# Patient Record
(demographics unavailable — no encounter records)

---

## 2024-10-24 NOTE — ASSESSMENT
[FreeTextEntry1] : Mr. 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 His recent follow up CT new peripheral lesion in the right lower lung. Referred by Dr. Elfego Agustin ([facility]).  Now s/p Right VATS, robotic assisted, RLL wedge resection with MLND on 04/25/2024. 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 All margins and LNs negative for tumor. [id number] (Stage IA3). - Previous history of squamous cell carcinoma of larynx is noted. The above tumor could be metastatic carcinoma from larynx, however primary lung is in differential diagnosis. Clinical and radiological correlation is suggested. All or portions of this case have undergone intradepartmental review (3, 4). - Chest tube pulled on 4/26/24. Pt with much subcutaneous air post op. No pneumothorax.  Now recently newly diagnosed with colon cancer.   He presents today for follow up with imaging.   I have independently reviewed the medical records and imaging at the time of this office consultation.  Recommendations reviewed with patient during this office visit, and all questions answered; Patient instructed on the importance of follow up and verbalizes understanding.

## 2024-10-24 NOTE — HISTORY OF PRESENT ILLNESS
[FreeTextEntry1] : Mr. 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 His recent follow up CT new peripheral lesion in the right lower lung. Referred by Dr. Elfego Agustin ([facility]).  CT Chest w/IV Contrast on 04/18/2022: - No evidence of thoracic metastasis.  CT Chest w/IV Contrast on 03/10/2023: - No pulmonary nodules are noted. - Small hiatal hernia.  CT Chest on 03/20/2024: - Interval development of approximate 10 mm solid nodule peripheral right lower lobe, abutting pleural surface (3/346, 602/38). - Trace fluid within left pulmonary recess (2/69). - Extensive coronary calcifications. Aortic calcifications.  PET/CT on 04/09/2024: - Asymmetrically increased FDG avid foci in the right hilum when compared with the left hilum. For example a focus in the right hilum shows SUV 3.3 (image 120). - The peripheral right lower lobe nodule evident on recent CT 3/20/2024 appears larger on this nonbreath held exam, 1.5 x 1.3 cm, SUV 16 (image 120); previously 1.1 x 1.0 cm. It is unclear whether this is due to differences in nonbreath held imaging technique or there has been true interval growth. - FDG avid right level 1B lymph node, 1.0 x 0.8 cm, SUV 6.7 (image 118); similar to CT 3/10/2023; previously not present on PET/CT 3/14/2018. - Markedly FDG avid lesion in the distal esophageal wall likely extending a few centimeters. This is concerning for malignancy. - FDG avid 1.8 x 1.1 cm mural-based colon lesion in the pelvis, SUV 10.4 (image 208). - Focal FDG avidity associated with a segment of small bowel in the left lower quadrant with small bowel thickening, possibly focal enteritis.  Now s/p Right VATS, robotic assisted, RLL wedge resection with MLND on 04/25/2024. 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 All margins and LNs negative for tumor. [id number] (Stage IA3). - Previous history of squamous cell carcinoma of larynx is noted. The above tumor could be metastatic carcinoma from larynx, however primary lung is in differential diagnosis. Clinical and radiological correlation is suggested. All or portions of this case have undergone intradepartmental review (3, 4). - Chest tube pulled on 4/26/24. Pt with much subcutaneous air post op. No pneumothorax.  05/15/2024: Path discussed patient and his wife, revealing SCC, tumor could be metastatic carcinoma from larynx, however primary lung is in differential diagnosis. I discussed he has to be closely monitored, and I would like him to obtain PET/CT in 4 months. Additionally, previous PET/CT with suspicious findings in his colon and esophageal wall, discussed he should get EGD and colonoscopy for further evaluation. Will refer to Dr. Dimas (GI).  EGD on 06/21/2024:  - No mass lesion or esophagitis as a cause of abnormal uptake on PET/CT, - Ring/ diverticulum in distal esophagus, benign appearing, biopsied.  * Path of distal esophagus reveals Squamous epithelium with patchy mild acute inflammation, congestion, and reactive changes consistent with reflux.  Colonoscopy on 06/21/2024: - One 20 mm polyp in the transverse colon, removed with endoscopic mucosal resection techniques of saline lift, hot snare, ad clipping. Resected. - Two other large colon polyps, not removed due to prep and bleeding risk with multiple EMR's.  * Path of large bowel, transverse reveals Adenomatous polyp. The stalk and base are free of adenomatous change.  PET/CT on 09/15/2024: - Emphysema.  - Status post interval wedge resection in the right lower lung lobe with removal of the previously seen FDG avid nodule and expected postsurgical changes which are mildly FDG avid. - Focal region of increased, linear FDG activity adjacent to the right lower lobe wedge resection likely corresponds to postsurgical changes. - Increase in conspicuity of asymmetrically increased FDG avidity in the right hilum, likely a lymph node that is difficult to measure on low-dose noncontrast. - A focus of increased FDG avidity adjacent to the right middle and lower lobe pulmonary arteries has a similar of SUV 5.7 (image 112), previously SUV 5.0. However, a second focus of increased FDG activity adjacent to the right lower lobe pulmonary artery has an SUV 10.2 (image 116), previously SUV 4.2. - Tracheostomy tube in place.  - Stable 1.0 x 0.9 cm right level Ib lymph node, SUV 4.7 (image 124 on dedicated/neck CT), previously measuring 1.0 x 0.8 cm, SUV 6.7. - Interval development of nonspecific foci of increased FDG avidity in the prostate gland, possibly inflammatory given time course of development.  - Resolution of distal esophageal and focal colon FDG uptake evident on prior exam.  Colonoscopy on 09/16/2024: - One 25 mm polyp at the recto-sigmoid colon, removed with endoscopic mucosal resection techniques of saline lift, hot snare, ad clipping. Resected. *Path of recto-sigmoid colon polyp reveals adenocarcinoma, moderately differentiated, arising in association with a villoglandular polyp. No definitive lymphovascular invasion. Tumor extensively involves submucosa. Tumor focally involves the deep cauterized margin of the specimen.    *** Referred to Dr. Trae Osuna (colorectal sx) and Dr. Ilene Blanco (heme/onc) ***  09/20/2024: Seen by Dr. Trae Osuna (colorectal sx). Surgery scheduled on 10/23/2024.   Last seen on 09/25/2024: Imaging revealing hilar lymphadenopathy, suspicious for malignancy. Discussed obtaining CT Chest w/IV Contrast in 11/2024 for further evaluation after his planned colon surgery. I recommend he returns to clinic after CT to discuss findings and next step, based on CT findings, may require flexible bronchoscopy with EBUS for tissue diagnosis.  Sx??  CT Chest on.... -   He presents today for follow up.

## 2024-10-31 NOTE — ASSESSMENT
[FreeTextEntry1] : Disease: Lung Ca - 2024 Pathology: squamous cell carcinoma moderately differentiated with necrosis, measuring 2.2 cm in greatest dimension. All margins and LNs negative for tumor.  TNM stage: [id number] (Stage IA3). S/p Right VATS, robotic assisted, RLL wedge resection on 04/25/2024 From 5/15/24 CTSX note: The above tumor could be metastatic carcinoma from larynx, however primary lung is in differential diagnosis.  Disease: Laryngeal Ca -2017 Pathology: sq cell ca   TNM stage: T3, N0, M0 AJCC Stage: III Completed CXRT treatment 8/11/2017 (Cisplatin weekly 40mg/m2, 6 cycles total from June 23-8/11/2017) He had a recurrence (biopsy 3/1/18) and had a total laryngectomy with neck dissections and reconstruction with ALTFF on 4/4/18

## 2024-10-31 NOTE — PHYSICAL EXAM
[Normal] : affect appropriate [de-identified] : indwelling voice prosthesis; stoma  [de-identified] : well healed abd incisions

## 2024-10-31 NOTE — PHYSICAL EXAM
[Normal] : affect appropriate [de-identified] : indwelling voice prosthesis; stoma  [de-identified] : well healed abd incisions

## 2024-10-31 NOTE — REVIEW OF SYSTEMS
[Diarrhea: Grade 0] : Diarrhea: Grade 0 [Negative] : Allergic/Immunologic [Recent Change In Weight] : ~T no recent weight change [Chest Pain] : no chest pain [Abdominal Pain] : no abdominal pain [de-identified] : well healed abd incisions

## 2024-10-31 NOTE — ASSESSMENT
[FreeTextEntry1] : Disease: Lung Ca - 2024 Pathology: squamous cell carcinoma moderately differentiated with necrosis, measuring 2.2 cm in greatest dimension. All margins and LNs negative for tumor.  TNM stage: [id number] (Stage IA3). S/p Right VATS, robotic assisted, RLL wedge resection on 04/25/2024 From 5/15/24 CTSX note: The above tumor could be metastatic carcinoma from larynx, however primary lung is in differential diagnosis.  Disease: Laryngeal Ca -2017 Pathology: sq cell ca   TNM stage: T3, N0, M0 AJCC Stage: III Completed CXRT treatment 8/11/2017 (Cisplatin weekly 40mg/m2, 6 cycles total from June 23-8/11/2017) He had a recurrence (biopsy 3/1/18) and had a total laryngectomy with neck dissections and reconstruction with ALTFF on 4/4/18

## 2024-10-31 NOTE — REVIEW OF SYSTEMS
[Diarrhea: Grade 0] : Diarrhea: Grade 0 [Negative] : Allergic/Immunologic [Recent Change In Weight] : ~T no recent weight change [Chest Pain] : no chest pain [Abdominal Pain] : no abdominal pain [de-identified] : well healed abd incisions

## 2024-10-31 NOTE — RESULTS/DATA
[FreeTextEntry1] : 10/23/24 Path: Colonic segment (sigmoid): Rare superficial residual malignant gland adjacent to biopsy site. No residual invasive carcinoma identified. All margins are widely free of malignant glands. Twelve pericolonic lymph nodes negative for malignancy (0/12). Previous polypectomy site with ulceration and exuberant granulation tissue extending focally into muscularis propria. Pigment (tattoo) present, predominantly in lymph nodes. Colonic segments (colorectal anastomosis): Negative for malignancy. Synoptic Summary Colon and Rectum - Resection Procedure: Sigmoidectomy Tumor Site: Rectosigmoid Histologic Type: Adenocarcinoma Histologic Grade: G2, moderately differentiated Tumor Size: Cannot be determined - Invasive tumor only present in fragmented previous specimen Tumor Extent: Invades submucosa Sub-mucosal Invasion: Present Depth of Sub-mucosal Invasion: Cannot be determined - Only present in fragmented previous specimen Extent of Sub-mucosal Invasion: Cannot be determined - Only present in fragmented previous specimen Macroscopic Tumor Perforation: Not identified Lymphatic and / or Vascular Invasion: Not identified Perineural Invasion: Not identified Tumor Budding Score: Cannot be determined - No invasive tumor in current specimen Type of Polyp in which Invasive Carcinoma Arose: Tubular adenoma Treatment Effect: No known presurgical therapy Margin Status for Invasive Carcinoma: All margins negative for invasive carcinoma Closest Margin(s) to Invasive Carcinoma: Distal Distance from Invasive Carcinoma to Closest Margin: Greater than 1 cm Distance from Invasive Carcinoma to Distal Margin: Distance already reported as closest margin Margin Status for Non-Invasive Tumor: All margins negative for high-grade dysplasia / intramucosal carcinoma and low-grade dysplasia Regional Lymph Node Status: All regional lymph nodes negative for tumor Number of Lymph Nodes Examined: 12 Tumor Deposits: Not identified Distant Site(s) Involved: Cannot be determined pTNM Classification (AJCC 8th Edition) pT Category: pT1 pN Category: pN0  .  COLONIC SEGMENT (SIGMOID): -   RARE SUPERFICIAL RESIDUAL MALIGNANT GLAND ADJACENT TO BIOPSY SITE. SEE COMMENT.  -   NO RESIDUAL INVASIVE CARCINOMA IDENTIFIED. -   ALL MARGINS ARE WIDELY FREE OF MALIGNANT GLANDS. -   TWELVE PERICOLONIC LYMPH NODES NEGATIVE FOR MALIGNANCY (0/12) -   PREVIOUS POLYPECTOMY SITE WITH ULCERATION AND EXUBERANT GRANULATION TISSUE EXTENDING FOCALLY INTO MUSCULARIS PROPRIA. -   PIGMENT ("TATTOO") PRESENT, PREDOMINANTLY IN LYMPH NODES.  2.  Colonic segments (colorectal anastomosis): -   Negative for malignancy.  9/16/24 Path: Colonic tissue (rectosigmoid polyp): adenocarcinoma, moderately differentiated, arising in association with a villoglandular polyp. No definitive lymphovascular invasion. Tumor extensively involves submucosa. Tumor focally involves the deep cauterized margin of the specimen. No loss of nuclear expression of MMR proteins (MLH1, MSH2, MSH6, and PMS2).  9/16/24 Colonoscopy: One 25 mm polyp at the recto-sigmoid colon, removed with endoscopic mucosal resection techniques of saline lift, hot snare, APC, clipping and tattoo. No other polyps.   9/15/24 PET: Compared to PET/CT April 9, 2024, there has been increased FDG avidity of a right hilar lymph node, concerning for lymph node metastasis. Status post right lower lobe wedge resection with postsurgical changes. Status post laryngectomy with reconstruction and tracheostomy. No evidence of local disease recurrence. Stable mildly FDG avid right level 1B lymph node. Interval development of nonspecific foci of increased FDG avidity in the prostate gland, possibly inflammatory given time course of development. Recommend correlation with PSA. Resolution of distal esophageal and focal colon FDG uptake evident on prior exam.  6/21/24 Colonoscopy: Two other large colon polyps, not removed due to prep and bleeding risk with multiple EMR's.   4/9/24 PET: FDG avid right level 1B lymph node, 1.0 x 0.8 cm, SUV 6.7, concerning for metastasis. FDG avid peripheral right lower lobe nodule, 1.5 x 1.3 cm, SUV 16, concerning for malignancy. Mildly FDG avid right hilar focus, indeterminate. Markedly FDG avid lesion in the distal esophageal wall likely extending a few centimeters, SUV 18. This is concerning for malignancy. FDG avid colon lesions, 1.8 x 1.1 cm, SUV 10.4. Focal FDG avidity associated with a segment of small bowel in the left lower quadrant with small bowel thickening, possibly focal enteritis. Marked enlargement of the urinary bladder.

## 2024-10-31 NOTE — HISTORY OF PRESENT ILLNESS
[T: ___] : T[unfilled] [N: ___] : N[unfilled] [AJCC Stage: ____] : AJCC Stage: [unfilled] [de-identified] : The patient was diagnosed with colon cancer in September 2024 at the age of 69. On 4/9/24, he had a PET that showed an FDG avid right level 1B lymph node, 1.0 x 0.8 cm, SUV 6.7, concerning for metastasis. FDG avid peripheral right lower lobe nodule, 1.5 x 1.3 cm, SUV 16, concerning for malignancy. Mildly FDG avid right hilar focus, indeterminate. Markedly FDG avid lesion in the distal esophageal wall likely extending a few centimeters, SUV 18 concerning for malignancy. FDG avid colon lesions, 1.8 x 1.1 cm, SUV 10.4. On 6/21/24, he underwent a colonoscopy where two large colon polyps were found. On 9/15/24, he had a repeat PET scan that showed increased FDG avidity of a right hilar lymph node, concerning for lymph node metastasis. Status post right lower lobe wedge resection with postsurgical changes. Status post laryngectomy with reconstruction and tracheostomy. No evidence of local disease recurrence. Stable mildly FDG avid right level 1B lymph node. Resolution of distal esophageal and focal colon FDG uptake on prior exam. On 9/16/24, a repeat colonoscopy had one 25 mm polyp at the recto-sigmoid colon, removed with EMR. No other polyps; pathology: adenocarcinoma, moderately differentiated, arising in association with a villoglandular polyp. No definitive lymphovascular invasion. Tumor extensively involves submucosa. Tumor focally involves the deep cauterized margin of the specimen. No loss of nuclear expression of MMR proteins (MLH1, MSH2, MSH6, and PMS2). On 10/23/24, he is s/p laparoscopic sigmoidectomy. Pathology showed rare superficial residual malignant glands adjacent to biopsy site. No residual invasive carcinoma identified. All margins are widely free of malignant glands. Twelve pericolonic lymph nodes were negative for malignancy (0/12). Previous polypectomy site with ulceration and exuberant granulation tissue extending focally into muscularis propria.  Colonic segments (colorectal anastomosis): Negative for malignancy. pT1N0.      [de-identified] : Colon adenocarcinoma [de-identified] : Medical Hx: GERD; laryngeal ca; T2DM, Afib on eliquis, HTN, HLD  Surgical Hx: total laryngectomy with neck dissections and reconstruction with ALTFF (tracheostomy) on 4/4/18; s/p robotic wedge resection of lower lobe of RIGHT lung on 04/25/2024; appendectomy  Family Hx: sister breast ca, 50's Social Hx: heavy smoking for >45 years, smoked 1ppd Quit 2004; ETOH none; , family nearby; retired  supervisor for [employer]   [de-identified] : Today he denies PRBPR, abd pain. Well healed surgical sites, normal bowel movements. He has chronic sciatica R sided, following with ortho. See full review of systems below.

## 2024-10-31 NOTE — HISTORY OF PRESENT ILLNESS
[T: ___] : T[unfilled] [N: ___] : N[unfilled] [AJCC Stage: ____] : AJCC Stage: [unfilled] [de-identified] : The patient was diagnosed with colon cancer in September 2024 at the age of 69. On 4/9/24, he had a PET that showed an FDG avid right level 1B lymph node, 1.0 x 0.8 cm, SUV 6.7, concerning for metastasis. FDG avid peripheral right lower lobe nodule, 1.5 x 1.3 cm, SUV 16, concerning for malignancy. Mildly FDG avid right hilar focus, indeterminate. Markedly FDG avid lesion in the distal esophageal wall likely extending a few centimeters, SUV 18 concerning for malignancy. FDG avid colon lesions, 1.8 x 1.1 cm, SUV 10.4. On 6/21/24, he underwent a colonoscopy where two large colon polyps were found. On 9/15/24, he had a repeat PET scan that showed increased FDG avidity of a right hilar lymph node, concerning for lymph node metastasis. Status post right lower lobe wedge resection with postsurgical changes. Status post laryngectomy with reconstruction and tracheostomy. No evidence of local disease recurrence. Stable mildly FDG avid right level 1B lymph node. Resolution of distal esophageal and focal colon FDG uptake on prior exam. On 9/16/24, a repeat colonoscopy had one 25 mm polyp at the recto-sigmoid colon, removed with EMR. No other polyps; pathology: adenocarcinoma, moderately differentiated, arising in association with a villoglandular polyp. No definitive lymphovascular invasion. Tumor extensively involves submucosa. Tumor focally involves the deep cauterized margin of the specimen. No loss of nuclear expression of MMR proteins (MLH1, MSH2, MSH6, and PMS2). On 10/23/24, he is s/p laparoscopic sigmoidectomy. Pathology showed rare superficial residual malignant glands adjacent to biopsy site. No residual invasive carcinoma identified. All margins are widely free of malignant glands. Twelve pericolonic lymph nodes were negative for malignancy (0/12). Previous polypectomy site with ulceration and exuberant granulation tissue extending focally into muscularis propria.  Colonic segments (colorectal anastomosis): Negative for malignancy. pT1N0.      [de-identified] : Colon adenocarcinoma [de-identified] : Medical Hx: GERD; laryngeal ca; T2DM, Afib on eliquis, HTN, HLD  Surgical Hx: total laryngectomy with neck dissections and reconstruction with ALTFF (tracheostomy) on 4/4/18; s/p robotic wedge resection of lower lobe of RIGHT lung on 04/25/2024; appendectomy  Family Hx: sister breast ca, 50's Social Hx: heavy smoking for >45 years, smoked 1ppd Quit 2004; ETOH none; , family nearby; retired  supervisor for [employer]   [de-identified] : Today he denies PRBPR, abd pain. Well healed surgical sites, normal bowel movements. He has chronic sciatica R sided, following with ortho. See full review of systems below.

## 2024-11-06 NOTE — PHYSICAL EXAM
[No Rash or Lesion] : No rash or lesion [Alert] : alert [Oriented to Person] : oriented to person [Oriented to Place] : oriented to place [Oriented to Time] : oriented to time [Calm] : calm [de-identified] : soft, non-tender, non-distended, surgical sites healing well, no erythema or drainage noted. Resolving ecchymosis periumbilical area noted.  [de-identified] : looks well, no distress [de-identified] : moves all extremities without difficulty.

## 2024-11-06 NOTE — ASSESSMENT
[FreeTextEntry1] : 69-year-old male recovering well. Recommend slowly advancing to high fiber diet as tolerated. Avoid heavy lifting for 4  weeks. Follow up with Dr. Osuna in 2 months.

## 2024-11-06 NOTE — HISTORY OF PRESENT ILLNESS
[FreeTextEntry1] : 69-year-old male with history of laryngeal cancer s/p laparoscopic sigmoid colon resection with low pelvic anastomosis for sigmoid colon cancer. Patient is doing well. Tolerating diet, denies fever/chills, reports regular bowel movements.

## 2024-11-20 NOTE — HISTORY OF PRESENT ILLNESS
[FreeTextEntry1] : Mr. 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 His recent follow up CT new peripheral lesion in the right lower lung. Referred by Dr. Elfego Agustin ([facility]).  CT Chest w/IV Contrast on 04/18/2022: - No evidence of thoracic metastasis.  CT Chest w/IV Contrast on 03/10/2023: - No pulmonary nodules are noted. - Small hiatal hernia.  CT Chest on 03/20/2024: - Interval development of approximate 10 mm solid nodule peripheral right lower lobe, abutting pleural surface (3/346, 602/38). - Trace fluid within left pulmonary recess (2/69). - Extensive coronary calcifications. Aortic calcifications.  PET/CT on 04/09/2024: - Asymmetrically increased FDG avid foci in the right hilum when compared with the left hilum. For example a focus in the right hilum shows SUV 3.3 (image 120). - The peripheral right lower lobe nodule evident on recent CT 3/20/2024 appears larger on this nonbreath held exam, 1.5 x 1.3 cm, SUV 16 (image 120); previously 1.1 x 1.0 cm. It is unclear whether this is due to differences in nonbreath held imaging technique or there has been true interval growth. - FDG avid right level 1B lymph node, 1.0 x 0.8 cm, SUV 6.7 (image 118); similar to CT 3/10/2023; previously not present on PET/CT 3/14/2018. - Markedly FDG avid lesion in the distal esophageal wall likely extending a few centimeters. This is concerning for malignancy. - FDG avid 1.8 x 1.1 cm mural-based colon lesion in the pelvis, SUV 10.4 (image 208). - Focal FDG avidity associated with a segment of small bowel in the left lower quadrant with small bowel thickening, possibly focal enteritis.  Now s/p Right VATS, robotic assisted, RLL wedge resection with MLND on 04/25/2024. 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 All margins and LNs negative for tumor. [id number] (Stage IA3). - Previous history of squamous cell carcinoma of larynx is noted. The above tumor could be metastatic carcinoma from larynx, however primary lung is in differential diagnosis. Clinical and radiological correlation is suggested. All or portions of this case have undergone intradepartmental review (3, 4). - Chest tube pulled on 4/26/24. Pt with much subcutaneous air post op. No pneumothorax.  05/15/2024: Path discussed patient and his wife, revealing SCC, tumor could be metastatic carcinoma from larynx, however primary lung is in differential diagnosis. I discussed he has to be closely monitored, and I would like him to obtain PET/CT in 4 months. Additionally, previous PET/CT with suspicious findings in his colon and esophageal wall, discussed he should get EGD and colonoscopy for further evaluation. Will refer to Dr. Dimas (GI).  EGD on 06/21/2024:  - No mass lesion or esophagitis as a cause of abnormal uptake on PET/CT, - Ring/ diverticulum in distal esophagus, benign appearing, biopsied.  * Path of distal esophagus reveals Squamous epithelium with patchy mild acute inflammation, congestion, and reactive changes consistent with reflux.  Colonoscopy on 06/21/2024: - One 20 mm polyp in the transverse colon, removed with endoscopic mucosal resection techniques of saline lift, hot snare, ad clipping. Resected. - Two other large colon polyps, not removed due to prep and bleeding risk with multiple EMR's.  * Path of large bowel, transverse reveals Adenomatous polyp. The stalk and base are free of adenomatous change.  PET/CT on 09/15/2024: - Emphysema.  - Status post interval wedge resection in the right lower lung lobe with removal of the previously seen FDG avid nodule and expected postsurgical changes which are mildly FDG avid. - Focal region of increased, linear FDG activity adjacent to the right lower lobe wedge resection likely corresponds to postsurgical changes. - Increase in conspicuity of asymmetrically increased FDG avidity in the right hilum, likely a lymph node that is difficult to measure on low-dose noncontrast. - A focus of increased FDG avidity adjacent to the right middle and lower lobe pulmonary arteries has a similar of SUV 5.7 (image 112), previously SUV 5.0. However, a second focus of increased FDG activity adjacent to the right lower lobe pulmonary artery has an SUV 10.2 (image 116), previously SUV 4.2. - Tracheostomy tube in place.  - Stable 1.0 x 0.9 cm right level Ib lymph node, SUV 4.7 (image 124 on dedicated/neck CT), previously measuring 1.0 x 0.8 cm, SUV 6.7. - Interval development of nonspecific foci of increased FDG avidity in the prostate gland, possibly inflammatory given time course of development.  - Resolution of distal esophageal and focal colon FDG uptake evident on prior exam.  Colonoscopy on 09/16/2024: - One 25 mm polyp at the recto-sigmoid colon, removed with endoscopic mucosal resection techniques of saline lift, hot snare, ad clipping. Resected. *Path of recto-sigmoid colon polyp reveals adenocarcinoma, moderately differentiated, arising in association with a villoglandular polyp. No definitive lymphovascular invasion. Tumor extensively involves submucosa. Tumor focally involves the deep cauterized margin of the specimen.    *** Referred to Dr. Trae Osuna (colorectal sx) and Dr. Ilene Blanco (heme/onc) ***  Last seen on 09/25/2024: Imaging revealing hilar lymphadenopathy, suspicious for malignancy. Discussed obtaining CT Chest w/IV Contrast in 11/2024 for further evaluation after his planned colon surgery. I recommend he returns to clinic after CT to discuss findings and next step, based on CT findings, may require flexible bronchoscopy with EBUS for tissue diagnosis.  Of Note: S/p laparoscopic sigmoidectomy, mobilization of splenic flexure and lysis of intestinal adhesions with Dr. Osuna on 10/23/2024.   CT Chest w/IV Contrast on 11/07/2024: - Emphysema.  - Right lower lobe wedge resection.  - Bandlike atelectasis/scarring within bilateral lower lobes. - Multiple right hilar lymph nodes, the largest one measures 2.4 x 1.9 cm are increased from prior exams likely metastatic. - Stable thickening of bilateral adrenal glands.  He presents today for follow up. Overall, he reports to be feeling well. Denies any chest pain, shortness of breath, cough, or hemoptysis.

## 2024-11-20 NOTE — ASSESSMENT
[FreeTextEntry1] : Mr. 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 His recent follow up CT new peripheral lesion in the right lower lung. Referred by Dr. Elfego Agustin ([facility]).  Now s/p Right VATS, robotic assisted, RLL wedge resection with MLND on 04/25/2024. 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 All margins and LNs negative for tumor. [id number] (Stage IA3). - Previous history of squamous cell carcinoma of larynx is noted. The above tumor could be metastatic carcinoma from larynx, however primary lung is in differential diagnosis. Clinical and radiological correlation is suggested. All or portions of this case have undergone intradepartmental review (3, 4). - Chest tube pulled on 4/26/24. Pt with much subcutaneous air post op. No pneumothorax.  Now recently newly diagnosed with colon cancer.   He presents today for follow up with imaging.   I have independently reviewed the medical records and imaging at the time of this office consultation. CT Chest reviewed with patient and his wife, once again enlarged hilar lymph node noted, suspicious for malignancy. Discussed biopsy for tissue diagnosis. Procedure approach reviewed with patient. Discussed risks in details, patient is agreeable to proceed. Will schedule for flexible bronchoscopy, EBUS, possible ion biopsy, with cytology sometime in 12/2024 after his scheduled back surgery. He will need to hold Eliquis for 3 days prior to procedure. Will need PST prior.   Recommendations reviewed with patient during this office visit, and all questions answered; Patient instructed on the importance of follow up and verbalizes understanding.    I, NATIVIDAD Vick, personally performed the evaluation and management (E/M) services for this established patient. That E/M includes conducting the examination, assessing all new/exacerbated conditions, and establishing a new plan of care. Today, my ACP, Richie Espinoza NP, was here to observe my evaluation and management services for this new problem/exacerbated condition to be followed going forward.

## 2024-11-20 NOTE — DATA REVIEWED
[FreeTextEntry1] : I have independently reviewed the following: CT Chest w/IV Contrast on 11/07/2024

## 2024-11-20 NOTE — ASSESSMENT
[FreeTextEntry1] : Mr. 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 His recent follow up CT new peripheral lesion in the right lower lung. Referred by Dr. Elfego Agustin ([facility]).  Now s/p Right VATS, robotic assisted, RLL wedge resection with MLND on 04/25/2024. 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 All margins and LNs negative for tumor. [id number] (Stage IA3). - Previous history of squamous cell carcinoma of larynx is noted. The above tumor could be metastatic carcinoma from larynx, however primary lung is in differential diagnosis. Clinical and radiological correlation is suggested. All or portions of this case have undergone intradepartmental review (3, 4). - Chest tube pulled on 4/26/24. Pt with much subcutaneous air post op. No pneumothorax.  Now recently newly diagnosed with colon cancer.   He presents today for follow up with imaging.   I have independently reviewed the medical records and imaging at the time of this office consultation. CT Chest reviewed with patient and his wife, once again enlarged hilar lymph node noted, suspicious for malignancy. Discussed biopsy for tissue diagnosis. Procedure approach reviewed with patient. Discussed risks in details, patient is agreeable to proceed. Will schedule for flexible bronchoscopy, EBUS, possible ion biopsy, with cytology sometime in 12/2024 after his scheduled back surgery. He will need to hold Eliquis for 3 days prior to procedure. Will need PST prior.   Recommendations reviewed with patient during this office visit, and all questions answered; Patient instructed on the importance of follow up and verbalizes understanding.    I, NATIVIDAD Vick, personally performed the evaluation and management (E/M) services for this established patient. That E/M includes conducting the examination, assessing all new/exacerbated conditions, and establishing a new plan of care. Today, my ACP, Richie Espinoza NP, was here to observe my evaluation and management services for this new problem/exacerbated condition to be followed going forward.

## 2024-11-20 NOTE — HISTORY OF PRESENT ILLNESS
[FreeTextEntry1] : Mr. 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 His recent follow up CT new peripheral lesion in the right lower lung. Referred by Dr. Elfego Agustin ([facility]).  CT Chest w/IV Contrast on 04/18/2022: - No evidence of thoracic metastasis.  CT Chest w/IV Contrast on 03/10/2023: - No pulmonary nodules are noted. - Small hiatal hernia.  CT Chest on 03/20/2024: - Interval development of approximate 10 mm solid nodule peripheral right lower lobe, abutting pleural surface (3/346, 602/38). - Trace fluid within left pulmonary recess (2/69). - Extensive coronary calcifications. Aortic calcifications.  PET/CT on 04/09/2024: - Asymmetrically increased FDG avid foci in the right hilum when compared with the left hilum. For example a focus in the right hilum shows SUV 3.3 (image 120). - The peripheral right lower lobe nodule evident on recent CT 3/20/2024 appears larger on this nonbreath held exam, 1.5 x 1.3 cm, SUV 16 (image 120); previously 1.1 x 1.0 cm. It is unclear whether this is due to differences in nonbreath held imaging technique or there has been true interval growth. - FDG avid right level 1B lymph node, 1.0 x 0.8 cm, SUV 6.7 (image 118); similar to CT 3/10/2023; previously not present on PET/CT 3/14/2018. - Markedly FDG avid lesion in the distal esophageal wall likely extending a few centimeters. This is concerning for malignancy. - FDG avid 1.8 x 1.1 cm mural-based colon lesion in the pelvis, SUV 10.4 (image 208). - Focal FDG avidity associated with a segment of small bowel in the left lower quadrant with small bowel thickening, possibly focal enteritis.  Now s/p Right VATS, robotic assisted, RLL wedge resection with MLND on 04/25/2024. 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 All margins and LNs negative for tumor. [id number] (Stage IA3). - Previous history of squamous cell carcinoma of larynx is noted. The above tumor could be metastatic carcinoma from larynx, however primary lung is in differential diagnosis. Clinical and radiological correlation is suggested. All or portions of this case have undergone intradepartmental review (3, 4). - Chest tube pulled on 4/26/24. Pt with much subcutaneous air post op. No pneumothorax.  05/15/2024: Path discussed patient and his wife, revealing SCC, tumor could be metastatic carcinoma from larynx, however primary lung is in differential diagnosis. I discussed he has to be closely monitored, and I would like him to obtain PET/CT in 4 months. Additionally, previous PET/CT with suspicious findings in his colon and esophageal wall, discussed he should get EGD and colonoscopy for further evaluation. Will refer to Dr. Dimas (GI).  EGD on 06/21/2024:  - No mass lesion or esophagitis as a cause of abnormal uptake on PET/CT, - Ring/ diverticulum in distal esophagus, benign appearing, biopsied.  * Path of distal esophagus reveals Squamous epithelium with patchy mild acute inflammation, congestion, and reactive changes consistent with reflux.  Colonoscopy on 06/21/2024: - One 20 mm polyp in the transverse colon, removed with endoscopic mucosal resection techniques of saline lift, hot snare, ad clipping. Resected. - Two other large colon polyps, not removed due to prep and bleeding risk with multiple EMR's.  * Path of large bowel, transverse reveals Adenomatous polyp. The stalk and base are free of adenomatous change.  PET/CT on 09/15/2024: - Emphysema.  - Status post interval wedge resection in the right lower lung lobe with removal of the previously seen FDG avid nodule and expected postsurgical changes which are mildly FDG avid. - Focal region of increased, linear FDG activity adjacent to the right lower lobe wedge resection likely corresponds to postsurgical changes. - Increase in conspicuity of asymmetrically increased FDG avidity in the right hilum, likely a lymph node that is difficult to measure on low-dose noncontrast. - A focus of increased FDG avidity adjacent to the right middle and lower lobe pulmonary arteries has a similar of SUV 5.7 (image 112), previously SUV 5.0. However, a second focus of increased FDG activity adjacent to the right lower lobe pulmonary artery has an SUV 10.2 (image 116), previously SUV 4.2. - Tracheostomy tube in place.  - Stable 1.0 x 0.9 cm right level Ib lymph node, SUV 4.7 (image 124 on dedicated/neck CT), previously measuring 1.0 x 0.8 cm, SUV 6.7. - Interval development of nonspecific foci of increased FDG avidity in the prostate gland, possibly inflammatory given time course of development.  - Resolution of distal esophageal and focal colon FDG uptake evident on prior exam.  Colonoscopy on 09/16/2024: - One 25 mm polyp at the recto-sigmoid colon, removed with endoscopic mucosal resection techniques of saline lift, hot snare, ad clipping. Resected. *Path of recto-sigmoid colon polyp reveals adenocarcinoma, moderately differentiated, arising in association with a villoglandular polyp. No definitive lymphovascular invasion. Tumor extensively involves submucosa. Tumor focally involves the deep cauterized margin of the specimen.    *** Referred to Dr. Trae Osuna (colorectal sx) and Dr. Ilene Blanco (heme/onc) ***  Last seen on 09/25/2024: Imaging revealing hilar lymphadenopathy, suspicious for malignancy. Discussed obtaining CT Chest w/IV Contrast in 11/2024 for further evaluation after his planned colon surgery. I recommend he returns to clinic after CT to discuss findings and next step, based on CT findings, may require flexible bronchoscopy with EBUS for tissue diagnosis.  Of Note: S/p laparoscopic sigmoidectomy, mobilization of splenic flexure and lysis of intestinal adhesions with Dr. Osuna on 10/23/2024.   CT Chest w/IV Contrast on 11/07/2024: - Emphysema.  - Right lower lobe wedge resection.  - Bandlike atelectasis/scarring within bilateral lower lobes. - Multiple right hilar lymph nodes, the largest one measures 2.4 x 1.9 cm are increased from prior exams likely metastatic. - Stable thickening of bilateral adrenal glands.  He presents today for follow up. Overall, he reports to be feeling well. Denies any chest pain, shortness of breath, cough, or hemoptysis.

## 2024-11-20 NOTE — ASSESSMENT
[FreeTextEntry1] : Mr. 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 His recent follow up CT new peripheral lesion in the right lower lung. Referred by Dr. Elfego Agustin ([facility]).  Now s/p Right VATS, robotic assisted, RLL wedge resection with MLND on 04/25/2024. 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 All margins and LNs negative for tumor. [id number] (Stage IA3). - Previous history of squamous cell carcinoma of larynx is noted. The above tumor could be metastatic carcinoma from larynx, however primary lung is in differential diagnosis. Clinical and radiological correlation is suggested. All or portions of this case have undergone intradepartmental review (3, 4). - Chest tube pulled on 4/26/24. Pt with much subcutaneous air post op. No pneumothorax.  Now recently newly diagnosed with colon cancer.   He presents today for follow up with imaging.   I have independently reviewed the medical records and imaging at the time of this office consultation. CT Chest reviewed with patient and his wife, once again enlarged hilar lymph node noted, suspicious for malignancy. Discussed biopsy for tissue diagnosis. Procedure approach reviewed with patient. Discussed risks in details, patient is agreeable to proceed. Will schedule for flexible bronchoscopy, EBUS, possible ion biopsy, with cytology sometime in 12/2024 after his scheduled back surgery. He will need to hold Eliquis for 3 days prior to procedure. Will need PST prior.   Recommendations reviewed with patient during this office visit, and all questions answered; Patient instructed on the importance of follow up and verbalizes understanding.    I, NATIVIDAD Vick, personally performed the evaluation and management (E/M) services for this established patient. That E/M includes conducting the examination, assessing all new/exacerbated conditions, and establishing a new plan of care. Today, my ACP, Rihcie Espinoza NP, was here to observe my evaluation and management services for this new problem/exacerbated condition to be followed going forward.

## 2024-11-20 NOTE — HISTORY OF PRESENT ILLNESS
[FreeTextEntry1] : Mr. 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 His recent follow up CT new peripheral lesion in the right lower lung. Referred by Dr. Elfego Agustin ([facility]).  CT Chest w/IV Contrast on 04/18/2022: - No evidence of thoracic metastasis.  CT Chest w/IV Contrast on 03/10/2023: - No pulmonary nodules are noted. - Small hiatal hernia.  CT Chest on 03/20/2024: - Interval development of approximate 10 mm solid nodule peripheral right lower lobe, abutting pleural surface (3/346, 602/38). - Trace fluid within left pulmonary recess (2/69). - Extensive coronary calcifications. Aortic calcifications.  PET/CT on 04/09/2024: - Asymmetrically increased FDG avid foci in the right hilum when compared with the left hilum. For example a focus in the right hilum shows SUV 3.3 (image 120). - The peripheral right lower lobe nodule evident on recent CT 3/20/2024 appears larger on this nonbreath held exam, 1.5 x 1.3 cm, SUV 16 (image 120); previously 1.1 x 1.0 cm. It is unclear whether this is due to differences in nonbreath held imaging technique or there has been true interval growth. - FDG avid right level 1B lymph node, 1.0 x 0.8 cm, SUV 6.7 (image 118); similar to CT 3/10/2023; previously not present on PET/CT 3/14/2018. - Markedly FDG avid lesion in the distal esophageal wall likely extending a few centimeters. This is concerning for malignancy. - FDG avid 1.8 x 1.1 cm mural-based colon lesion in the pelvis, SUV 10.4 (image 208). - Focal FDG avidity associated with a segment of small bowel in the left lower quadrant with small bowel thickening, possibly focal enteritis.  Now s/p Right VATS, robotic assisted, RLL wedge resection with MLND on 04/25/2024. 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 All margins and LNs negative for tumor. [id number] (Stage IA3). - Previous history of squamous cell carcinoma of larynx is noted. The above tumor could be metastatic carcinoma from larynx, however primary lung is in differential diagnosis. Clinical and radiological correlation is suggested. All or portions of this case have undergone intradepartmental review (3, 4). - Chest tube pulled on 4/26/24. Pt with much subcutaneous air post op. No pneumothorax.  05/15/2024: Path discussed patient and his wife, revealing SCC, tumor could be metastatic carcinoma from larynx, however primary lung is in differential diagnosis. I discussed he has to be closely monitored, and I would like him to obtain PET/CT in 4 months. Additionally, previous PET/CT with suspicious findings in his colon and esophageal wall, discussed he should get EGD and colonoscopy for further evaluation. Will refer to Dr. Dimas (GI).  EGD on 06/21/2024:  - No mass lesion or esophagitis as a cause of abnormal uptake on PET/CT, - Ring/ diverticulum in distal esophagus, benign appearing, biopsied.  * Path of distal esophagus reveals Squamous epithelium with patchy mild acute inflammation, congestion, and reactive changes consistent with reflux.  Colonoscopy on 06/21/2024: - One 20 mm polyp in the transverse colon, removed with endoscopic mucosal resection techniques of saline lift, hot snare, ad clipping. Resected. - Two other large colon polyps, not removed due to prep and bleeding risk with multiple EMR's.  * Path of large bowel, transverse reveals Adenomatous polyp. The stalk and base are free of adenomatous change.  PET/CT on 09/15/2024: - Emphysema.  - Status post interval wedge resection in the right lower lung lobe with removal of the previously seen FDG avid nodule and expected postsurgical changes which are mildly FDG avid. - Focal region of increased, linear FDG activity adjacent to the right lower lobe wedge resection likely corresponds to postsurgical changes. - Increase in conspicuity of asymmetrically increased FDG avidity in the right hilum, likely a lymph node that is difficult to measure on low-dose noncontrast. - A focus of increased FDG avidity adjacent to the right middle and lower lobe pulmonary arteries has a similar of SUV 5.7 (image 112), previously SUV 5.0. However, a second focus of increased FDG activity adjacent to the right lower lobe pulmonary artery has an SUV 10.2 (image 116), previously SUV 4.2. - Tracheostomy tube in place.  - Stable 1.0 x 0.9 cm right level Ib lymph node, SUV 4.7 (image 124 on dedicated/neck CT), previously measuring 1.0 x 0.8 cm, SUV 6.7. - Interval development of nonspecific foci of increased FDG avidity in the prostate gland, possibly inflammatory given time course of development.  - Resolution of distal esophageal and focal colon FDG uptake evident on prior exam.  Colonoscopy on 09/16/2024: - One 25 mm polyp at the recto-sigmoid colon, removed with endoscopic mucosal resection techniques of saline lift, hot snare, ad clipping. Resected. *Path of recto-sigmoid colon polyp reveals adenocarcinoma, moderately differentiated, arising in association with a villoglandular polyp. No definitive lymphovascular invasion. Tumor extensively involves submucosa. Tumor focally involves the deep cauterized margin of the specimen.    *** Referred to Dr. Trae Osuna (colorectal sx) and Dr. Ilene Blanco (heme/onc) ***  Last seen on 09/25/2024: Imaging revealing hilar lymphadenopathy, suspicious for malignancy. Discussed obtaining CT Chest w/IV Contrast in 11/2024 for further evaluation after his planned colon surgery. I recommend he returns to clinic after CT to discuss findings and next step, based on CT findings, may require flexible bronchoscopy with EBUS for tissue diagnosis.  Of Note: S/p laparoscopic sigmoidectomy, mobilization of splenic flexure and lysis of intestinal adhesions with Dr. Osuna on 10/23/2024.   CT Chest w/IV Contrast on 11/07/2024: - Emphysema.  - Right lower lobe wedge resection.  - Bandlike atelectasis/scarring within bilateral lower lobes. - Multiple right hilar lymph nodes, the largest one measures 2.4 x 1.9 cm are increased from prior exams likely metastatic. - Stable thickening of bilateral adrenal glands.  He presents today for follow up. Overall, he reports to be feeling well. Denies any chest pain, shortness of breath, cough, or hemoptysis.

## 2024-11-20 NOTE — HISTORY OF PRESENT ILLNESS
[FreeTextEntry1] : Mr. 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 His recent follow up CT new peripheral lesion in the right lower lung. Referred by Dr. Elfego Agustin ([facility]).  CT Chest w/IV Contrast on 04/18/2022: - No evidence of thoracic metastasis.  CT Chest w/IV Contrast on 03/10/2023: - No pulmonary nodules are noted. - Small hiatal hernia.  CT Chest on 03/20/2024: - Interval development of approximate 10 mm solid nodule peripheral right lower lobe, abutting pleural surface (3/346, 602/38). - Trace fluid within left pulmonary recess (2/69). - Extensive coronary calcifications. Aortic calcifications.  PET/CT on 04/09/2024: - Asymmetrically increased FDG avid foci in the right hilum when compared with the left hilum. For example a focus in the right hilum shows SUV 3.3 (image 120). - The peripheral right lower lobe nodule evident on recent CT 3/20/2024 appears larger on this nonbreath held exam, 1.5 x 1.3 cm, SUV 16 (image 120); previously 1.1 x 1.0 cm. It is unclear whether this is due to differences in nonbreath held imaging technique or there has been true interval growth. - FDG avid right level 1B lymph node, 1.0 x 0.8 cm, SUV 6.7 (image 118); similar to CT 3/10/2023; previously not present on PET/CT 3/14/2018. - Markedly FDG avid lesion in the distal esophageal wall likely extending a few centimeters. This is concerning for malignancy. - FDG avid 1.8 x 1.1 cm mural-based colon lesion in the pelvis, SUV 10.4 (image 208). - Focal FDG avidity associated with a segment of small bowel in the left lower quadrant with small bowel thickening, possibly focal enteritis.  Now s/p Right VATS, robotic assisted, RLL wedge resection with MLND on 04/25/2024. 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 All margins and LNs negative for tumor. [id number] (Stage IA3). - Previous history of squamous cell carcinoma of larynx is noted. The above tumor could be metastatic carcinoma from larynx, however primary lung is in differential diagnosis. Clinical and radiological correlation is suggested. All or portions of this case have undergone intradepartmental review (3, 4). - Chest tube pulled on 4/26/24. Pt with much subcutaneous air post op. No pneumothorax.  05/15/2024: Path discussed patient and his wife, revealing SCC, tumor could be metastatic carcinoma from larynx, however primary lung is in differential diagnosis. I discussed he has to be closely monitored, and I would like him to obtain PET/CT in 4 months. Additionally, previous PET/CT with suspicious findings in his colon and esophageal wall, discussed he should get EGD and colonoscopy for further evaluation. Will refer to Dr. Dimas (GI).  EGD on 06/21/2024:  - No mass lesion or esophagitis as a cause of abnormal uptake on PET/CT, - Ring/ diverticulum in distal esophagus, benign appearing, biopsied.  * Path of distal esophagus reveals Squamous epithelium with patchy mild acute inflammation, congestion, and reactive changes consistent with reflux.  Colonoscopy on 06/21/2024: - One 20 mm polyp in the transverse colon, removed with endoscopic mucosal resection techniques of saline lift, hot snare, ad clipping. Resected. - Two other large colon polyps, not removed due to prep and bleeding risk with multiple EMR's.  * Path of large bowel, transverse reveals Adenomatous polyp. The stalk and base are free of adenomatous change.  PET/CT on 09/15/2024: - Emphysema.  - Status post interval wedge resection in the right lower lung lobe with removal of the previously seen FDG avid nodule and expected postsurgical changes which are mildly FDG avid. - Focal region of increased, linear FDG activity adjacent to the right lower lobe wedge resection likely corresponds to postsurgical changes. - Increase in conspicuity of asymmetrically increased FDG avidity in the right hilum, likely a lymph node that is difficult to measure on low-dose noncontrast. - A focus of increased FDG avidity adjacent to the right middle and lower lobe pulmonary arteries has a similar of SUV 5.7 (image 112), previously SUV 5.0. However, a second focus of increased FDG activity adjacent to the right lower lobe pulmonary artery has an SUV 10.2 (image 116), previously SUV 4.2. - Tracheostomy tube in place.  - Stable 1.0 x 0.9 cm right level Ib lymph node, SUV 4.7 (image 124 on dedicated/neck CT), previously measuring 1.0 x 0.8 cm, SUV 6.7. - Interval development of nonspecific foci of increased FDG avidity in the prostate gland, possibly inflammatory given time course of development.  - Resolution of distal esophageal and focal colon FDG uptake evident on prior exam.  Colonoscopy on 09/16/2024: - One 25 mm polyp at the recto-sigmoid colon, removed with endoscopic mucosal resection techniques of saline lift, hot snare, ad clipping. Resected. *Path of recto-sigmoid colon polyp reveals adenocarcinoma, moderately differentiated, arising in association with a villoglandular polyp. No definitive lymphovascular invasion. Tumor extensively involves submucosa. Tumor focally involves the deep cauterized margin of the specimen.    *** Referred to Dr. Trae Osuna (colorectal sx) and Dr. Ilene Blanco (heme/onc) ***  Last seen on 09/25/2024: Imaging revealing hilar lymphadenopathy, suspicious for malignancy. Discussed obtaining CT Chest w/IV Contrast in 11/2024 for further evaluation after his planned colon surgery. I recommend he returns to clinic after CT to discuss findings and next step, based on CT findings, may require flexible bronchoscopy with EBUS for tissue diagnosis.  Of Note: S/p laparoscopic sigmoidectomy, mobilization of splenic flexure and lysis of intestinal adhesions with Dr. Osuna on 10/23/2024.   CT Chest w/IV Contrast on 11/07/2024: - Emphysema.  - Right lower lobe wedge resection.  - Bandlike atelectasis/scarring within bilateral lower lobes. - Multiple right hilar lymph nodes, the largest one measures 2.4 x 1.9 cm are increased from prior exams likely metastatic. - Stable thickening of bilateral adrenal glands.  He presents today for follow up. Overall, he reports to be feeling well. Denies any chest pain, shortness of breath, cough, or hemoptysis.

## 2024-11-20 NOTE — ASSESSMENT
[FreeTextEntry1] : Mr. 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 His recent follow up CT new peripheral lesion in the right lower lung. Referred by Dr. Elfego Agustin ([facility]).  Now s/p Right VATS, robotic assisted, RLL wedge resection with MLND on 04/25/2024. 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 All margins and LNs negative for tumor. [id number] (Stage IA3). - Previous history of squamous cell carcinoma of larynx is noted. The above tumor could be metastatic carcinoma from larynx, however primary lung is in differential diagnosis. Clinical and radiological correlation is suggested. All or portions of this case have undergone intradepartmental review (3, 4). - Chest tube pulled on 4/26/24. Pt with much subcutaneous air post op. No pneumothorax.  Now recently newly diagnosed with colon cancer.   He presents today for follow up with imaging.   I have independently reviewed the medical records and imaging at the time of this office consultation. CT Chest reviewed with patient and his wife, once again enlarged hilar lymph node noted, suspicious for malignancy. Discussed biopsy for tissue diagnosis. Procedure approach reviewed with patient. Discussed risks in details, patient is agreeable to proceed. Will schedule for flexible bronchoscopy, EBUS, possible ion biopsy, with cytology sometime in 12/2024 after his scheduled back surgery. He will need to hold Eliquis for 3 days prior to procedure. Will need PST prior.   Recommendations reviewed with patient during this office visit, and all questions answered; Patient instructed on the importance of follow up and verbalizes understanding.    I, NATIVIDAD Vick, personally performed the evaluation and management (E/M) services for this established patient. That E/M includes conducting the examination, assessing all new/exacerbated conditions, and establishing a new plan of care. Today, my ACP, Richie Espinoza NP, was here to observe my evaluation and management services for this new problem/exacerbated condition to be followed going forward.

## 2024-12-03 NOTE — ASSESSMENT
[FreeTextEntry1] : 67 yo M with urinary retention. Performs CIC 4x/day.  Continue CIC.  for bph, start tamsulsoin for DU, start bethanechol

## 2024-12-03 NOTE — HISTORY OF PRESENT ILLNESS
[FreeTextEntry1] : 68 year old man seen 05/09/2024 with complaint of urinary retention. He is s/p robotic wedge resection of lower lobe of RIGHT lung. He was not able to void post op and mars was placed. He was started on tamsulosin and a TOV was attempted before discharge but he failed to void. TOV was again attempted today, but pt could not void.  mL  05/21/24: Patient presents for follow up. Preforms CIC 3-4x a day without difficulty. Patient states he has the urge to the void but is unable to generate flow. Patient interested in possible ways to recover bladder function.   12/03/2024: Patient presents for follow up. Continues CIC, no spontaneous voiding. Doing well.

## 2024-12-03 NOTE — PHYSICAL EXAM
[Normal Appearance] : normal appearance [Well Groomed] : well groomed [General Appearance - In No Acute Distress] : no acute distress [Edema] : no peripheral edema [Respiration, Rhythm And Depth] : normal respiratory rhythm and effort [Exaggerated Use Of Accessory Muscles For Inspiration] : no accessory muscle use [Abdomen Soft] : soft [Abdomen Tenderness] : non-tender [Costovertebral Angle Tenderness] : no ~M costovertebral angle tenderness [Urinary Bladder Findings] : the bladder was normal on palpation [Normal Station and Gait] : the gait and station were normal for the patient's age [] : no rash [No Focal Deficits] : no focal deficits [Oriented To Time, Place, And Person] : oriented to person, place, and time [Affect] : the affect was normal [Mood] : the mood was normal [No Palpable Adenopathy] : no palpable adenopathy [de-identified] : mars draining clear yellow urine

## 2024-12-06 NOTE — HISTORY OF PRESENT ILLNESS
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 *HTN *DM, hpoT4, HLP, former smoker quit 20 yrs, >20 yrs 1ppd.  here for followup. Since last visit had colon resection Oct '24. went into afib w/ RVR postop then converted to sinus spontaneously amio 400 mg po BID for 7 days then 200 mg daily started. advised followup in 1 week.   did not followup.  Amio Rx ran out pt is now off amio.  Also was admitted Nov '24 to [facility] for elecive L3-L5 laminectomy tolerated procedure w/o complication. no afib.  next thursday planned for bronchoscopy w/ Dr. Martinez b/c lymph node abnormality. Reviewed Dr. Martinez's note: "enlarged hilar lymph node noted, suspicious for malignancy" "flexible bronchoscopy, EBUS, possible ion biopsy, with cytology" planned Planned for next thursday  reviewed meds: eliquis 5 mg BID metoprolol suc 25 daily  losratn 25 daily stopped by PCP liptor 20 mg qhs   ================================ HISTORY: *Apr '24 - Initial visit-preop clearance no h/o afib cleared for surgery. *May '24 - seen as hospital followup. had afib postop w/ RVR new diagnosis. Difficult to rate control converted to sinus prior to starting amio.  Maintained on amio postop. Amio 200 continued. *Jun '24 - cleared for endoscopy amio continued  *Jul '24 - amio d/c'd *Aug '24 - epatch for 2 weeks prescribed cleared for colonoscopy *Oct '24 - epatch 2 weeks - afib burden 2.8% one episode 6 hrs rate 100s-120s. cleared for colon resection ================================ TESTING: *ECG sinus brandy no ischemic changes. HR 45 note ave HRs 60s w/ epatch.  *TTE Apr '24: EF 60-65% normal LA mild AV thickening normal PASP.  *Epatch 8/27/'24-9/10/'24 Primary rhythm was sinus rhythm. Average heart rates 63, range . Afib/flutter burden 2.79%. Longest event 6hrs 50 minutes on day 14 at 01:49 AM, fastest event 143 bpm on day e at 820 AM. SVE burden 1.7% Longest pause 2.2 sec. PVC burden 1.3% Longest ventricular arrhythmia 3 beats.  *CT Chest Mar '24 (NOT CORONARY CT) "extensive coronary artery calcification."  "aortic calcification" ================================    ==================================== ==================================== ==================================== ==================================== Discharge Note Provider  Shrink  Status Complete: Signed  Document Date 11- 08:22  Facility [facility]  Encounter Date 11- 06:04  Clinician Jose Arce  Last Update Date 11- 08:22  Doc Type  [id number]  Finalized Date 12- 08:08  Hospital Course: Discharge Date 23-Nov-2024 Admission Date 21-Nov-2024 06:04 Reason for Admission L3[facility] The patient is a 69y Male status post elective L3-5 Laminectomy after failing outpatient nonoperative conservative management. Patient presented to [facility] after being medically cleared for an elective surgical procedure. The patient was taken to the operating room on date mentioned above. Prophylactic antibiotics were started before the procedure and continued for 24 hours. There were no complications during the procedure and patient tolerated the procedure well. The patient was transferred to the recovery room in stable condition and subsequently to the surgical floor. DVT prophylaxis was held while in hospital. All home medications were continued. The patient received physical therapy daily and daily labs were followed. The dressing was kept clean, dry, intact. Drain was removed on POD2. The rest of the hospital stay was unremarkable.  Med Reconciliation: Medication Reconciliation Status Admission Reconciliation is Completed Discharge Reconciliation is Completed Discharge Medications apixaban 5 mg oral tablet: 1 tab(s) orally every 12 hours Please restart your eliquis starting on 11/25 (Monday) atorvastatin 20 mg oral tablet: 1 tab(s) orally once a day (in the morning) Bactrim  mg-160 mg oral tablet: 1 tab(s) orally 2 times a day Colace 100 mg oral capsule: 1 cap(s) orally every 8 hours as needed for constipation cyclobenzaprine 5 mg oral tablet: 1 tab(s) orally every 8 hours as needed for muscle spasm levothyroxine 100 mcg (0.1 mg) oral capsule: 1 cap(s) orally once a day (in the morning) losartan 25 mg oral tablet: 1 tab(s) orally once a day (in the morning) metFORMIN 500 mg oral tablet: 3 tab(s) orally once a day (in the morning) BREAKFAST Metoprolol Succinate ER 50 mg oral tablet, extended release: 1 tab(s) orally once a day (in the morning) mupirocin 2% topical ointment: Apply topically to affected area 2 times a day x 5 days oxyCODONE 5 mg oral tablet: 1 tab(s) orally every 6 hours as needed for severe pain MDD: 4  ,....................... ,....................... ,....................... Last Updated: 05-Dec-2024 08:08 by Isak Oleary) ========================== =--===================== Hospital Course: Discharge Date 27-Oct-2024  Admission Date 23-Oct-2024 08:34 Hospital Course 69 year old man with history of laryngeal CA s/p chemotherapy with tracheostomy, T2DM, Afib on eliquis, HTN, HLD recently had a colonoscopy in september and was found to have a large polypoid mass of the rectosigmoid colon area, biopsies demonstrate adenocarcinoma. Patient is now s/p laparoscopic sigmoidectomy , mobilization of splenic flexure and lysis of intestinal adhesions with Dr. Osuna. Patient went into Afib with RVR changed to tele floor, cardio on board, now NSR, pt ambulating, tolerating diet, no n/v, passing flatus. Stable for discharge.  Med Reconciliation: Medication Reconciliation Status Admission Reconciliation is Completed Discharge Reconciliation is Completed Discharge Medications amiodarone 400 mg oral tablet: 1 tab(s) orally every 12 hours Take as prescribed for one week apixaban 5 mg oral tablet: 1 tab(s) orally every 12 hours atorvastatin 20 mg oral tablet: 1 tab(s) orally once a day (at bedtime) levothyroxine 100 mcg (0.1 mg) oral capsule: 1 cap(s) orally once a day losartan 25 mg oral tablet: 1 tab(s) orally once a day metFORMIN 500 mg oral tablet: 3 tab(s) orally once a day with meals Metoprolol Succinate ER 50 mg oral tablet, extended release: 1 tab(s) orally once a day oxycodone-acetaminophen 5 mg-325 mg oral tablet: 1 tab(s) orally every 6 hours as needed for moderate pain MDD: 004 , , Care Plan/Procedures: Discharge Diagnoses, Assessment and Plan of Treatment PRINCIPAL DISCHARGE DIAGNOSIS Diagnosis: Colon cancer Assessment and Plan of Treatment: ....................... ....................... .......................  Electronic Signatures: Gianni Pike) (Signed 27-Oct-2024 09:21) Authored: Hospital Course, Care Plan/Procedures Karyn Rueda) (Signed 27-Oct-2024 20:26) Co-Signer: Hospital Course, Med Reconciliation, Care Plan/Procedures, Follow Up, Quality Measures, Document Complete Kelton Randle) (Signed 26-Oct-2024 15:41) Authored: Hospital Course, Med Reconciliation, Care Plan/Procedures, Follow Up, Quality Measures, Document Complete  Last Updated: 27-Oct-2024 20:26 by Karyn Rueda) ======================= ============================= Progress Note: - Provider Specialty Cardiology .......................  Developed rapid atrial flutter this morning. Remains without symptoms. no   MEDICATIONS: OUTPATIENT Home Medications: atorvastatin 20 mg oral tablet: 1 tab(s) orally once a day (at bedtime) (23 Oct 2024 09:03) levothyroxine 100 mcg (0.1 mg) oral capsule: 1 cap(s) orally once a day (23 Oct 2024 09:03) losartan 25 mg oral tablet: 1 tab(s) orally once a day (23 Oct 2024 09:03) metFORMIN 500 mg oral tablet: 3 tab(s) orally once a day with meals (23 Oct 2024 09:03) Metoprolol Succinate ER 50 mg oral tablet, extended release: 1 tab(s) orally once a day (23 Oct 2024 09:03)   INPATIENT MEDICATIONS (STANDING): alvimopan 12 milliGRAM(s) Oral every 12 hours apixaban 5 milliGRAM(s) Oral every 12 hours atorvastatin 20 milliGRAM(s) Oral at bedtime dextrose 5%. 1000 milliLiter(s) (100 mL/Hr) IV Continuous <Continuous> dextrose 5%. 1000 milliLiter(s) (50 mL/Hr) IV Continuous <Continuous> dextrose 50% Injectable 25 Gram(s) IV Push once dextrose 50% Injectable 12.5 Gram(s) IV Push once dextrose 50% Injectable 25 Gram(s) IV Push once digoxin Tablet 250 MICROGram(s) Oral daily glucagon Injectable 1 milliGRAM(s) IntraMuscular once influenza Vaccine (HIGH DOSE) 0.5 milliLiter(s) IntraMuscular once insulin lispro (ADMELOG) corrective regimen sliding scale SubCutaneous three times a day before meals levothyroxine 100 MICROGram(s) Oral daily losartan 25 milliGRAM(s) Oral daily metoprolol succinate ER 50 milliGRAM(s) Oral daily ondansetron Injectable 4 milliGRAM(s) IV Push every 8 hours sodium chloride 0.9% lock flush 3 milliLiter(s) IV Push every 8 hours  MEDICATIONS (PRN): acetaminophen Tablet .. 650 milliGRAM(s) Oral every 6 hours PRN Temp greater or equal to 38C (100.4F), Mild Pain (1 - 3) dextrose Oral Gel 15 Gram(s) Oral once PRN Blood Glucose LESS THAN 70 milliGRAM(s)/deciliter metoprolol tartrate Injectable 5 milliGRAM(s) IV Push every 6 hours PRN HR > 130 oxyCODONE IR 10 milliGRAM(s) Oral every 4 hours PRN Severe Pain (7 - 10) oxyCODONE IR 5 milliGRAM(s) Oral every 4 hours PRN Moderate Pain (4 - 6)  ....................... ....................... =============================== =============================== CARDIAC BIOMARKERS: BNP   TROPONIN Troponin I, High Sensitivity Result: 13.48 ng/L (10-25-24 @ 10:43) Troponin I, High Sensitivity Result: 4.04 ng/L (04-27-24 @ 12:05) Troponin T, Serum: < 0.06 ng/mL [0.00 - 0.06] (04-05-18 @ 02:25)   [phone] ([facility] Office)    Assessment and Plan: Problem/Plan - 1: - Problem: Paroxysmal atrial fibrillation. - Plan: converted to sinus yesterday. Had short runs of afib/aflutter in 120s between 1200 & 1400 currently NSR.  -Start amio 400 po BID for 7 days then change to 200 mg daily maintenance. -Cont. BB -Cont. eliquis -D/C DIG  -Followup w/ KEN cuellar in clinic in 1 week. discussed w/ hospitalist & surgery team -will sign off please call if questions.  Problem/Plan - 2: - Problem: Benign essential HTN. - Plan: Controlled.   Electronic Signatures: Valdemar Cuellar) (Signed 26-Oct-2024 15:30) Authored: Progress Note, Subjective and Objective, Assessment and Plan   Last Updated: 26-Oct-2024 15:30 by Valdemar Cuellar) =========================== =============================== Consult Note: Provider Specialty: Cardiology.  Referral/Consultation: Initial Consult: - Requested by Name: Colorectal - Date/Time: 24-Oct-2024 12:52 - Reason for Referral/Consultation: Chronic Afib. HTN - Reason for Admission elective sigmoid colon resection   - Subjective and Objective:  REASON FOR VISIT: AF, HTN  HPI: 69 year old man with history of laryngeal CA s/p chemotherapy with tracheostomy, T2DM, Afib on eliquis, HTN, HLD recently had a colonoscopy in september and was found to have a large polypoid mass of the rectosigmoid colon area, biopsies demonstrate adenocarcinoma. Patient is now s/p laparoscopic sigmoidectomy , mobilization of splenic flexure and lysis of intestinal adhesions with Dr. Osuna. Patient's tracheostomy is capped.  Cardiology consulted for post op medical management. This is a pt. of Dr. Cuellar with cardiac hx of Chronic Afib, HTN, no cardiac complaints at this time. .......................  Assessment and Recommendation: Problem/Recommendation - 1: - Problem: Paroxysmal atrial fibrillation. - Recommendation: Paroxysmal AF; Chronic anticoagulation (Eliquis) now on hold given recent surgery. Recommendation: check EKG, cont. BB, restart AC with eliquis when cleared by colorectal surgery.  Problem/Recommendation - 2: - Problem: Benign essential HTN. - Recommendation: controlled on current regimen - losartan 25 mg po daily and toprol xl 25 mg po daily.   Electronic Signatures: Hiram Tsang) (Signed 24-Oct-2024 13:04) Authored: Subjective and Objective, Assessment and Recommendation Co-Signer: Consult Note, Referral/Consultation, Subjective and Objective, Assessment and Recommendation Tameka Cope (NP) (Signed 24-Oct-2024 12:58) Authored: Consult Note, Referral/Consultation, Subjective and Objective, Assessment and Recommendation   Last Updated: 24-Oct-2024 13:04 by Hiram Tsang)

## 2024-12-06 NOTE — ASSESSMENT
[FreeTextEntry1] : A/P: ============================== *preop clearance -cleared for bronchoscopy low risk for cardiac events periop. -I discussed w/ him amio loading as he has afib w/ RVR twice after two procedures  although of note not after recent laminectomy. amiodarone 400 mg BID for 3 days then 200 mg daily. Will d/c amio postop after I see him 1 week after his endoscopy. I will d/w him workup for afib at that time including possible referral to EP for afib ablation & evaluation of coronary calcification. -hold eliquis 5 mg BID 2 days prior to procedure minmum longer if needed -resume when safe procedureally no more than 1 week. ============================== *rdfh-xmnhykjtbt-ITDRV8-VASc=3 (DM, HTN, >65) -see above. ============================== *HTN -BP controlled. ============================== Return 1 week after endoscopy procedure.

## 2024-12-19 NOTE — HISTORY OF PRESENT ILLNESS
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 *HTN *DM, hpoT4, HLP, former smoker quit 20 yrs, >20 yrs 1ppd.  tolerated procedure w/o complication reviewed monitoring 1 anita episode off amio 6 hrs  ave HRs 90s  sister w/ stents 60s another sister MI in 60s. parents -   A/P:  -d/c amio -Coronary CT angio ordered to quantify Ca & evaluate for obstructive CAD. -Extensive coronary Ca on CT scan -pt reports mild dyspnea on exertion. -Return apt Jan 29th wed  Return in 3 weeks to review results. ================================ HISTORY: *Apr '24 - Initial visit-preop clearance no h/o afib cleared for surgery. *May '24 - seen as hospital followup. had afib postop w/ RVR new diagnosis. Difficult to rate control converted to sinus prior to starting amio. Maintained on amio postop. Amio 200 continued. *Jun '24 - cleared for endoscopy amio continued *Jul '24 - amio d/c'd *Aug '24 - epatch for 2 weeks prescribed cleared for colonoscopy *Oct '24 - epatch 2 weeks - afib burden 2.8% one episode 6 hrs rate 100s-120s. cleared for colon resection ================================ TESTING: *ECG sinus brandy no ischemic changes. HR 45 note ave HRs 60s w/ epatch.  *TTE Apr '24: EF 60-65% normal LA mild AV thickening normal PASP.  *Epatch 8/27/'24-9/10/'24 Primary rhythm was sinus rhythm. Average heart rates 63, range . Afib/flutter burden 2.79%. Longest event 6hrs 50 minutes on day 14 at 01:49 AM, fastest event 143 bpm on day e at 820 AM. SVE burden 1.7% Longest pause 2.2 sec. PVC burden 1.3% Longest ventricular arrhythmia 3 beats.  *CT Chest Mar '24 (NOT CORONARY CT) "extensive coronary artery calcification." "aortic calcification" ================================

## 2024-12-23 NOTE — HISTORY OF PRESENT ILLNESS
[FreeTextEntry1] : Mr. 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 His recent follow up CT new peripheral lesion in the right lower lung. Referred by Dr. Elfego Agustin ([facility]).  CT Chest w/IV Contrast on 04/18/2022: - No evidence of thoracic metastasis.  CT Chest w/IV Contrast on 03/10/2023: - No pulmonary nodules are noted. - Small hiatal hernia.  CT Chest on 03/20/2024: - Interval development of approximate 10 mm solid nodule peripheral right lower lobe, abutting pleural surface (3/346, 602/38). - Trace fluid within left pulmonary recess (2/69). - Extensive coronary calcifications. Aortic calcifications.  PET/CT on 04/09/2024: - Asymmetrically increased FDG avid foci in the right hilum when compared with the left hilum. For example a focus in the right hilum shows SUV 3.3 (image 120). - The peripheral right lower lobe nodule evident on recent CT 3/20/2024 appears larger on this nonbreath held exam, 1.5 x 1.3 cm, SUV 16 (image 120); previously 1.1 x 1.0 cm. It is unclear whether this is due to differences in nonbreath held imaging technique or there has been true interval growth. - FDG avid right level 1B lymph node, 1.0 x 0.8 cm, SUV 6.7 (image 118); similar to CT 3/10/2023; previously not present on PET/CT 3/14/2018. - Markedly FDG avid lesion in the distal esophageal wall likely extending a few centimeters. This is concerning for malignancy. - FDG avid 1.8 x 1.1 cm mural-based colon lesion in the pelvis, SUV 10.4 (image 208). - Focal FDG avidity associated with a segment of small bowel in the left lower quadrant with small bowel thickening, possibly focal enteritis.  Now s/p Right VATS, robotic assisted, RLL wedge resection with MLND on 04/25/2024. 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 All margins and LNs negative for tumor. [id number] (Stage IA3). - Previous history of squamous cell carcinoma of larynx is noted. The above tumor could be metastatic carcinoma from larynx, however primary lung is in differential diagnosis. Clinical and radiological correlation is suggested. All or portions of this case have undergone intradepartmental review (3, 4). - Chest tube pulled on 4/26/24. Pt with much subcutaneous air post op. No pneumothorax.  05/15/2024: Path discussed patient and his wife, revealing SCC, tumor could be metastatic carcinoma from larynx, however primary lung is in differential diagnosis. I discussed he has to be closely monitored, and I would like him to obtain PET/CT in 4 months. Additionally, previous PET/CT with suspicious findings in his colon and esophageal wall, discussed he should get EGD and colonoscopy for further evaluation. Will refer to Dr. Dimas (GI).  EGD on 06/21/2024:  - No mass lesion or esophagitis as a cause of abnormal uptake on PET/CT, - Ring/ diverticulum in distal esophagus, benign appearing, biopsied.  * Path of distal esophagus reveals Squamous epithelium with patchy mild acute inflammation, congestion, and reactive changes consistent with reflux.  Colonoscopy on 06/21/2024: - One 20 mm polyp in the transverse colon, removed with endoscopic mucosal resection techniques of saline lift, hot snare, ad clipping. Resected. - Two other large colon polyps, not removed due to prep and bleeding risk with multiple EMR's.  * Path of large bowel, transverse reveals Adenomatous polyp. The stalk and base are free of adenomatous change.  PET/CT on 09/15/2024: - Emphysema.  - Status post interval wedge resection in the right lower lung lobe with removal of the previously seen FDG avid nodule and expected postsurgical changes which are mildly FDG avid. - Focal region of increased, linear FDG activity adjacent to the right lower lobe wedge resection likely corresponds to postsurgical changes. - Increase in conspicuity of asymmetrically increased FDG avidity in the right hilum, likely a lymph node that is difficult to measure on low-dose noncontrast. - A focus of increased FDG avidity adjacent to the right middle and lower lobe pulmonary arteries has a similar of SUV 5.7 (image 112), previously SUV 5.0. However, a second focus of increased FDG activity adjacent to the right lower lobe pulmonary artery has an SUV 10.2 (image 116), previously SUV 4.2. - Tracheostomy tube in place.  - Stable 1.0 x 0.9 cm right level Ib lymph node, SUV 4.7 (image 124 on dedicated/neck CT), previously measuring 1.0 x 0.8 cm, SUV 6.7. - Interval development of nonspecific foci of increased FDG avidity in the prostate gland, possibly inflammatory given time course of development.  - Resolution of distal esophageal and focal colon FDG uptake evident on prior exam.  Colonoscopy on 09/16/2024: - One 25 mm polyp at the recto-sigmoid colon, removed with endoscopic mucosal resection techniques of saline lift, hot snare, ad clipping. Resected. *Path of recto-sigmoid colon polyp reveals adenocarcinoma, moderately differentiated, arising in association with a villoglandular polyp. No definitive lymphovascular invasion. Tumor extensively involves submucosa. Tumor focally involves the deep cauterized margin of the specimen.    *** Referred to Dr. Trae Osuna (colorectal sx) and Dr. Ilene Blanco (heme/onc) ***  09/25/2024: Imaging revealing hilar lymphadenopathy, suspicious for malignancy. Discussed obtaining CT Chest w/IV Contrast in 11/2024 for further evaluation after his planned colon surgery. I recommend he returns to clinic after CT to discuss findings and next step, based on CT findings, may require flexible bronchoscopy with EBUS for tissue diagnosis.  Of Note: S/p laparoscopic sigmoidectomy, mobilization of splenic flexure and lysis of intestinal adhesions with Dr. Osuna on 10/23/2024.   CT Chest w/IV Contrast on 11/07/2024: - Emphysema.  - Right lower lobe wedge resection.  - Bandlike atelectasis/scarring within bilateral lower lobes. - Multiple right hilar lymph nodes, the largest one measures 2.4 x 1.9 cm are increased from prior exams likely metastatic. - Stable thickening of bilateral adrenal glands.  Now s/p Flexible bronchoscopy EBUS with RLL BAL on 12/12/2024.  *Path of LN right inferior interlobar station 11R reveals metastatic squamous cell carcinoma. The tumor cells are immunoreactive for p40, negative for CDX2, TTF-1.  The immunostain profile supports the above diagnosis. *Path of RLL BAL - Negative for malignant cells. Cytospin preparation and cell block bronchial cells, histiocytes and red blood cells.   He presents today for follow up. Overall, he reports to be feeling well. Denies any chest pain, shortness of breath, cough, or hemoptysis.

## 2024-12-23 NOTE — HISTORY OF PRESENT ILLNESS
[FreeTextEntry1] : Mr. 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 His recent follow up CT new peripheral lesion in the right lower lung. Referred by Dr. Elfego Agustin ([facility]).  CT Chest w/IV Contrast on 04/18/2022: - No evidence of thoracic metastasis.  CT Chest w/IV Contrast on 03/10/2023: - No pulmonary nodules are noted. - Small hiatal hernia.  CT Chest on 03/20/2024: - Interval development of approximate 10 mm solid nodule peripheral right lower lobe, abutting pleural surface (3/346, 602/38). - Trace fluid within left pulmonary recess (2/69). - Extensive coronary calcifications. Aortic calcifications.  PET/CT on 04/09/2024: - Asymmetrically increased FDG avid foci in the right hilum when compared with the left hilum. For example a focus in the right hilum shows SUV 3.3 (image 120). - The peripheral right lower lobe nodule evident on recent CT 3/20/2024 appears larger on this nonbreath held exam, 1.5 x 1.3 cm, SUV 16 (image 120); previously 1.1 x 1.0 cm. It is unclear whether this is due to differences in nonbreath held imaging technique or there has been true interval growth. - FDG avid right level 1B lymph node, 1.0 x 0.8 cm, SUV 6.7 (image 118); similar to CT 3/10/2023; previously not present on PET/CT 3/14/2018. - Markedly FDG avid lesion in the distal esophageal wall likely extending a few centimeters. This is concerning for malignancy. - FDG avid 1.8 x 1.1 cm mural-based colon lesion in the pelvis, SUV 10.4 (image 208). - Focal FDG avidity associated with a segment of small bowel in the left lower quadrant with small bowel thickening, possibly focal enteritis.  Now s/p Right VATS, robotic assisted, RLL wedge resection with MLND on 04/25/2024. 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 All margins and LNs negative for tumor. [id number] (Stage IA3). - Previous history of squamous cell carcinoma of larynx is noted. The above tumor could be metastatic carcinoma from larynx, however primary lung is in differential diagnosis. Clinical and radiological correlation is suggested. All or portions of this case have undergone intradepartmental review (3, 4). - Chest tube pulled on 4/26/24. Pt with much subcutaneous air post op. No pneumothorax.  05/15/2024: Path discussed patient and his wife, revealing SCC, tumor could be metastatic carcinoma from larynx, however primary lung is in differential diagnosis. I discussed he has to be closely monitored, and I would like him to obtain PET/CT in 4 months. Additionally, previous PET/CT with suspicious findings in his colon and esophageal wall, discussed he should get EGD and colonoscopy for further evaluation. Will refer to Dr. Dimas (GI).  EGD on 06/21/2024:  - No mass lesion or esophagitis as a cause of abnormal uptake on PET/CT, - Ring/ diverticulum in distal esophagus, benign appearing, biopsied.  * Path of distal esophagus reveals Squamous epithelium with patchy mild acute inflammation, congestion, and reactive changes consistent with reflux.  Colonoscopy on 06/21/2024: - One 20 mm polyp in the transverse colon, removed with endoscopic mucosal resection techniques of saline lift, hot snare, ad clipping. Resected. - Two other large colon polyps, not removed due to prep and bleeding risk with multiple EMR's.  * Path of large bowel, transverse reveals Adenomatous polyp. The stalk and base are free of adenomatous change.  PET/CT on 09/15/2024: - Emphysema.  - Status post interval wedge resection in the right lower lung lobe with removal of the previously seen FDG avid nodule and expected postsurgical changes which are mildly FDG avid. - Focal region of increased, linear FDG activity adjacent to the right lower lobe wedge resection likely corresponds to postsurgical changes. - Increase in conspicuity of asymmetrically increased FDG avidity in the right hilum, likely a lymph node that is difficult to measure on low-dose noncontrast. - A focus of increased FDG avidity adjacent to the right middle and lower lobe pulmonary arteries has a similar of SUV 5.7 (image 112), previously SUV 5.0. However, a second focus of increased FDG activity adjacent to the right lower lobe pulmonary artery has an SUV 10.2 (image 116), previously SUV 4.2. - Tracheostomy tube in place.  - Stable 1.0 x 0.9 cm right level Ib lymph node, SUV 4.7 (image 124 on dedicated/neck CT), previously measuring 1.0 x 0.8 cm, SUV 6.7. - Interval development of nonspecific foci of increased FDG avidity in the prostate gland, possibly inflammatory given time course of development.  - Resolution of distal esophageal and focal colon FDG uptake evident on prior exam.  Colonoscopy on 09/16/2024: - One 25 mm polyp at the recto-sigmoid colon, removed with endoscopic mucosal resection techniques of saline lift, hot snare, ad clipping. Resected. *Path of recto-sigmoid colon polyp reveals adenocarcinoma, moderately differentiated, arising in association with a villoglandular polyp. No definitive lymphovascular invasion. Tumor extensively involves submucosa. Tumor focally involves the deep cauterized margin of the specimen.    *** Referred to Dr. Trae Osuna (colorectal sx) and Dr. Ilene Blanco (heme/onc) ***  09/25/2024: Imaging revealing hilar lymphadenopathy, suspicious for malignancy. Discussed obtaining CT Chest w/IV Contrast in 11/2024 for further evaluation after his planned colon surgery. I recommend he returns to clinic after CT to discuss findings and next step, based on CT findings, may require flexible bronchoscopy with EBUS for tissue diagnosis.  Of Note: S/p laparoscopic sigmoidectomy, mobilization of splenic flexure and lysis of intestinal adhesions with Dr. Osuna on 10/23/2024.   CT Chest w/IV Contrast on 11/07/2024: - Emphysema.  - Right lower lobe wedge resection.  - Bandlike atelectasis/scarring within bilateral lower lobes. - Multiple right hilar lymph nodes, the largest one measures 2.4 x 1.9 cm are increased from prior exams likely metastatic. - Stable thickening of bilateral adrenal glands.  Now s/p Flexible bronchoscopy EBUS with RLL BAL on 12/12/2024.  *Path of LN right inferior interlobar station 11R reveals metastatic squamous cell carcinoma. The tumor cells are immunoreactive for p40, negative for CDX2, TTF-1.  The immunostain profile supports the above diagnosis. *Path of RLL BAL - Negative for malignant cells. Cytospin preparation and cell block bronchial cells, histiocytes and red blood cells.   He presents today for follow up. Overall, he reports to be feeling well. Denies any chest pain, shortness of breath, cough, or hemoptysis.

## 2024-12-23 NOTE — ASSESSMENT
[FreeTextEntry1] : Mr. TU DIAZ, 69 year old male, s/p Right VATS, robotic assisted, RLL wedge resection with MLND on 04/25/2024 for Stage 1A3 Squamous cell carcinoma. Recently diagnosed with colon cancer and was found with lymphadenopathy.   Now s/p Flexible bronchoscopy EBUS with RLL BAL on 12/12/2024.  *Path of LN right inferior interlobar station 11R reveals metastatic squamous cell carcinoma. The tumor cells are immunoreactive for p40, negative for CDX2, TTF-1.  The immunostain profile supports the above diagnosis. *Path of RLL BAL - Negative for malignant cells. Cytospin preparation and cell block bronchial cells, histiocytes and red blood cells.  He presents today for follow up to discuss path.    I have independently reviewed the medical records and imaging at the time of this office consultation. Path reviewed with patient and his wife, positive for metastatic squamous cell carcinoma. Will refer to Dr. Luisito Comer (heme/onc) to establish care and discuss further treatment plan. Case discussed thoroughly with both Dr. Comer and Dr. Agustin. He will return to clinic as needed; patient is agreeable.   Recommendations reviewed with patient during this office visit, and all questions answered; Patient instructed on the importance of follow up and verbalizes understanding.    I, NATIVIDAD Vick, personally performed the evaluation and management (E/M) services for this established patient. That E/M includes conducting the examination, assessing all new/exacerbated conditions, and establishing a new plan of care. Today, my ACP, Richie Espinoza NP, was here to observe my evaluation and management services for this new problem/exacerbated condition to be followed going forward.

## 2024-12-23 NOTE — HISTORY OF PRESENT ILLNESS
[FreeTextEntry1] : Mr. 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 His recent follow up CT new peripheral lesion in the right lower lung. Referred by Dr. Elfego Agustin ([facility]).  CT Chest w/IV Contrast on 04/18/2022: - No evidence of thoracic metastasis.  CT Chest w/IV Contrast on 03/10/2023: - No pulmonary nodules are noted. - Small hiatal hernia.  CT Chest on 03/20/2024: - Interval development of approximate 10 mm solid nodule peripheral right lower lobe, abutting pleural surface (3/346, 602/38). - Trace fluid within left pulmonary recess (2/69). - Extensive coronary calcifications. Aortic calcifications.  PET/CT on 04/09/2024: - Asymmetrically increased FDG avid foci in the right hilum when compared with the left hilum. For example a focus in the right hilum shows SUV 3.3 (image 120). - The peripheral right lower lobe nodule evident on recent CT 3/20/2024 appears larger on this nonbreath held exam, 1.5 x 1.3 cm, SUV 16 (image 120); previously 1.1 x 1.0 cm. It is unclear whether this is due to differences in nonbreath held imaging technique or there has been true interval growth. - FDG avid right level 1B lymph node, 1.0 x 0.8 cm, SUV 6.7 (image 118); similar to CT 3/10/2023; previously not present on PET/CT 3/14/2018. - Markedly FDG avid lesion in the distal esophageal wall likely extending a few centimeters. This is concerning for malignancy. - FDG avid 1.8 x 1.1 cm mural-based colon lesion in the pelvis, SUV 10.4 (image 208). - Focal FDG avidity associated with a segment of small bowel in the left lower quadrant with small bowel thickening, possibly focal enteritis.  Now s/p Right VATS, robotic assisted, RLL wedge resection with MLND on 04/25/2024. 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 All margins and LNs negative for tumor. [id number] (Stage IA3). - Previous history of squamous cell carcinoma of larynx is noted. The above tumor could be metastatic carcinoma from larynx, however primary lung is in differential diagnosis. Clinical and radiological correlation is suggested. All or portions of this case have undergone intradepartmental review (3, 4). - Chest tube pulled on 4/26/24. Pt with much subcutaneous air post op. No pneumothorax.  05/15/2024: Path discussed patient and his wife, revealing SCC, tumor could be metastatic carcinoma from larynx, however primary lung is in differential diagnosis. I discussed he has to be closely monitored, and I would like him to obtain PET/CT in 4 months. Additionally, previous PET/CT with suspicious findings in his colon and esophageal wall, discussed he should get EGD and colonoscopy for further evaluation. Will refer to Dr. Dimas (GI).  EGD on 06/21/2024:  - No mass lesion or esophagitis as a cause of abnormal uptake on PET/CT, - Ring/ diverticulum in distal esophagus, benign appearing, biopsied.  * Path of distal esophagus reveals Squamous epithelium with patchy mild acute inflammation, congestion, and reactive changes consistent with reflux.  Colonoscopy on 06/21/2024: - One 20 mm polyp in the transverse colon, removed with endoscopic mucosal resection techniques of saline lift, hot snare, ad clipping. Resected. - Two other large colon polyps, not removed due to prep and bleeding risk with multiple EMR's.  * Path of large bowel, transverse reveals Adenomatous polyp. The stalk and base are free of adenomatous change.  PET/CT on 09/15/2024: - Emphysema.  - Status post interval wedge resection in the right lower lung lobe with removal of the previously seen FDG avid nodule and expected postsurgical changes which are mildly FDG avid. - Focal region of increased, linear FDG activity adjacent to the right lower lobe wedge resection likely corresponds to postsurgical changes. - Increase in conspicuity of asymmetrically increased FDG avidity in the right hilum, likely a lymph node that is difficult to measure on low-dose noncontrast. - A focus of increased FDG avidity adjacent to the right middle and lower lobe pulmonary arteries has a similar of SUV 5.7 (image 112), previously SUV 5.0. However, a second focus of increased FDG activity adjacent to the right lower lobe pulmonary artery has an SUV 10.2 (image 116), previously SUV 4.2. - Tracheostomy tube in place.  - Stable 1.0 x 0.9 cm right level Ib lymph node, SUV 4.7 (image 124 on dedicated/neck CT), previously measuring 1.0 x 0.8 cm, SUV 6.7. - Interval development of nonspecific foci of increased FDG avidity in the prostate gland, possibly inflammatory given time course of development.  - Resolution of distal esophageal and focal colon FDG uptake evident on prior exam.  Colonoscopy on 09/16/2024: - One 25 mm polyp at the recto-sigmoid colon, removed with endoscopic mucosal resection techniques of saline lift, hot snare, ad clipping. Resected. *Path of recto-sigmoid colon polyp reveals adenocarcinoma, moderately differentiated, arising in association with a villoglandular polyp. No definitive lymphovascular invasion. Tumor extensively involves submucosa. Tumor focally involves the deep cauterized margin of the specimen.    *** Referred to Dr. Trae Osuna (colorectal sx) and Dr. Ilene Blanco (heme/onc) ***  09/25/2024: Imaging revealing hilar lymphadenopathy, suspicious for malignancy. Discussed obtaining CT Chest w/IV Contrast in 11/2024 for further evaluation after his planned colon surgery. I recommend he returns to clinic after CT to discuss findings and next step, based on CT findings, may require flexible bronchoscopy with EBUS for tissue diagnosis.  Of Note: S/p laparoscopic sigmoidectomy, mobilization of splenic flexure and lysis of intestinal adhesions with Dr. Osuna on 10/23/2024.   CT Chest w/IV Contrast on 11/07/2024: - Emphysema.  - Right lower lobe wedge resection.  - Bandlike atelectasis/scarring within bilateral lower lobes. - Multiple right hilar lymph nodes, the largest one measures 2.4 x 1.9 cm are increased from prior exams likely metastatic. - Stable thickening of bilateral adrenal glands.  Now s/p Flexible bronchoscopy EBUS with RLL BAL on 12/12/2024.  *Path of LN right inferior interlobar station 11R reveals metastatic squamous cell carcinoma. The tumor cells are immunoreactive for p40, negative for CDX2, TTF-1.  The immunostain profile supports the above diagnosis. *Path of RLL BAL - Negative for malignant cells. Cytospin preparation and cell block bronchial cells, histiocytes and red blood cells.   He presents today for follow up. Overall, he reports to be feeling well. Denies any chest pain, shortness of breath, cough, or hemoptysis.

## 2025-01-16 NOTE — ASSESSMENT
[FreeTextEntry1] : 70 yo M with urinary retention. Performs CIC 4x/day.  Continue CIC. Discussed treatment options. Patient will consider options and RTO for further workup after treatment for lymph node metastasis. For BPH, continue tamsulosin. For DU, continue bethanechol.  Patient will RTO in 3 months.

## 2025-01-16 NOTE — PHYSICAL EXAM
[Normal Appearance] : normal appearance [Well Groomed] : well groomed [General Appearance - In No Acute Distress] : no acute distress [] : no respiratory distress [Respiration, Rhythm And Depth] : normal respiratory rhythm and effort [Exaggerated Use Of Accessory Muscles For Inspiration] : no accessory muscle use [Abdomen Soft] : soft [Abdomen Tenderness] : non-tender [Costovertebral Angle Tenderness] : no ~M costovertebral angle tenderness [Urinary Bladder Findings] : the bladder was normal on palpation [Normal Station and Gait] : the gait and station were normal for the patient's age [No Focal Deficits] : no focal deficits [Oriented To Time, Place, And Person] : oriented to person, place, and time [Affect] : the affect was normal [Mood] : the mood was normal [de-identified] : mars draining clear yellow urine

## 2025-01-16 NOTE — HISTORY OF PRESENT ILLNESS
[FreeTextEntry1] : 68-year-old man seen 05/09/2024 with complaint of urinary retention. He is s/p robotic wedge resection of lower lobe of RIGHT lung. He was not able to void post op and mars was placed. He was started on tamsulosin and a TOV was attempted before discharge, but he failed to void. TOV was again attempted today, but pt could not void.  mL  05/21/24: Patient presents for follow up. Preforms CIC 3-4x a day without difficulty. Patient states he has the urge to the void but is unable to generate flow. Patient interested in possible ways to recover bladder function.   12/03/2024: Patient presents for follow up. Continues CIC, no spontaneous voiding. Doing well.   01/16/25: Patient presents for follow up. Has the urge to void but unable to generate stream despite bethanechol and tamsulosin. He continues CIC 3-4x/day without complications. Of note, patient is about to undergo treatment for lymph node metastasis.

## 2025-01-29 NOTE — REASON FOR VISIT
[Consideration of Curative Therapy] : consideration of curative therapy for [Head and Neck Cancer] : head and neck cancer [Lung Cancer] : lung cancer [Spouse] : spouse

## 2025-01-29 NOTE — HISTORY OF PRESENT ILLNESS
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 *HTN *DM, hpoT4, HLP, former smoker quit 20 yrs, >20 yrs 1ppd.  Reviewed results - proximal to mid LAD  stenosis also distal stenosis in other arteries FFR neg by two reports but one report states concern re: anatomic appearance of stenosis.  ECG NSR no ischemic changes.   meds: asa stopped prior to previous surgeries lipitor 20 mg qhs metoprolol succ 25 daily   A/P:  -Schedule for cardiac cath next week given  abnormal CTA w/ proximal to mid LAD stenosis.  -Return 1 week after cath to review results.  ======================= chemotherapy Dr. jose Grimes MD Medical Oncology at [facility] [place] [email or web address] Chief of Division of Medicine - Hematology/Oncology, [facility]. Hematology and Oncology

## 2025-01-29 NOTE — HISTORY OF PRESENT ILLNESS
[FreeTextEntry1] : The patient is a pleasant 69 year old male with history of locally advanced larynx carcinoma, early stage colon cancer, lung cancer and now squamous cell carcinoma to multiple lung anita stations. Patient referred by Dr. Grimes.   Medical Hx: GERD; T2DM, Afib on Eliquis, HTN, HLD, laryngeal ca, colon cancer, lung cancer.   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 Argueta). He completed treatment 8/11/17. Due to residual disease, he then underwent a total laryngectomy with neck dissections and reconstruction with ALTFF (tracheostomy) on 4/4/18; Patient is with tracheoesophageal puncture and voice prosthesis.   He has been following up with Dr. Agustin regularly. CT chest on 3/20/24 showed a10 mm solid nodule, peripheral right lower lobe, abutting pleural surface.   PET/CT on 4/9/24 showed  1. FDG avid right level 1B lymph node, concerning for metastasis. Recommend ultrasound and biopsy for correlation. 2. FDG avid peripheral right lower lobe nodule, concerning for malignancy. Mildly FDG avid right hilar focus, indeterminate. 3. Markedly FDG avid lesion in the distal esophageal wall likely extending a few centimeters. This is concerning for malignancy. Recommend endoscopy. 4. FDG avid colon lesions, as above. Recommend correlation with colonoscopy. 5. Focal FDG avidity associated with a segment of small bowel in the left lower quadrant with small bowel thickening, possibly focal enteritis. Recommend CT enterography to evaluate for luminal lesions. 6. Marked enlargement of the urinary bladder.  Pt referred to Dr. Martinez and now post Right VATS, robotic assisted, RLL wedge resection with MLND 4/25/2024, path of RLL reveals Squamous cell carcinoma moderately differentiated with necrosis, measuring 2.2 cm in greatest dimension. All margins and LNs negative for tumor.   [id number] (Stage IA3).  PET/CT 9/15/24 showed: 1. Compared to PET/CT April 9, 2024, there has been increased FDG avidity of a right hilar lymph node, concerning for lymph node metastasis. 2. Status post right lower lobe wedge resection with postsurgical changes. 3. Status post laryngectomy with reconstruction and tracheostomy. No evidence of local disease recurrence. Stable mildly FDG avid right level 1B lymph node. 4. Interval development of nonspecific foci of increased FDG avidity in the prostate gland, possibly inflammatory given time course of development. Recommend correlation with PSA. 5. Resolution of distal esophageal and focal colon FDG uptake evident on prior exam.  CT chest 11/07/24 showed Multiple right hilar lymph nodes, the largest one measures 2.4 x 1.9 cm, are increased from prior exams likely metastatic. Status post right lower lobe wedge resection. No suspicious lung nodule.  Patient was also diagnosed with colon cancer and had Laparoscopic sigmoidectomy on 10/23/24 (Dr. Osuna)  for adenocarcinoma stage T1, N0 AJCC Stage: Stage I. No definitive lymphovascular invasion. No loss of nuclear expression of MMR proteins (MLH1, MSH2, MSH6, and PMS2). No residual invasive carcinoma identified. All margins negative.   On 12/12/24 patient underwent Flexible bronchoscopy EBUS with RLL BAL on 12/12/2024. Pathology revealed: 1.  Lymph node, right inferior interlobar station 11 Ri: -   Metastatic squamous cell carcinoma. -   The tumor cells are immunoreactive for p40, negative for CDX2, TTF-1.  The immunostain profile supports the above diagnosis. 2.  Right lower lobe bronchioalveolar lavage: -   Negative for malignant cells. -   Cytospin preparation and cell block bronchial cells, histiocytes and red blood cells.  01/4/25 Brain MRI negative for metastasis.  1/19/25 PET/ CT :  1.new FDG avid left upper lobe anterior segment nodule and enlarged right middle lobe fissural nodule with increased FDG activity. Higly susp for malignancy. 2. More prominent right hilar nodes with increased activity and new FDG avid right infrahilar and mediastinal nodes compatible with disease involvement. 3. Limited evaluation of bowel loops- cont. surveillance with colonoscopy. 4. Stable FDG avid right level 1B, otherwise no suspicious FDG avid malignant tissue in the head and neck.  Patient met with Dr. Zurita who recommended carbo, Abraxane, Keytruda ( Keynote 407 regimen). He is starting chemotherapy on Monday 2/3/25.  He reports 20 lb weight loss over past year. He denies pain, n/v, dysphagia.   He presents with his wife Connie to discuss the role of radiation therapy in his care.

## 2025-01-29 NOTE — VITALS
[Least Pain Intensity: 0/10] : 0/10 [Pain Location: ___] : Pain Location: [unfilled] [Opioid] : opioid [Date: ____________] : Patient's last distress assessment performed on [unfilled]. [0 - No Distress] : Distress Level: 0 [Patient given social work contact information and resource sheet] : Patient was given social work contact information and resource sheet [Maximal Pain Intensity: 3/10] : 3/10 [80: Normal activity with effort; some signs or symptoms of disease.] : 80: Normal activity with effort; some signs or symptoms of disease.

## 2025-01-29 NOTE — REVIEW OF SYSTEMS
[Recent Change In Weight] : ~T recent weight change [Joint Pain] : joint pain [Muscle Pain] : muscle pain [Negative] : Allergic/Immunologic

## 2025-01-29 NOTE — HISTORY OF PRESENT ILLNESS
[FreeTextEntry1] : The patient is a pleasant 69 year old male with history of locally advanced larynx carcinoma, early stage colon cancer, lung cancer and now squamous cell carcinoma to multiple lung anita stations. Patient referred by Dr. Grimes.   Medical Hx: GERD; T2DM, Afib on Eliquis, HTN, HLD, laryngeal ca, colon cancer, lung cancer.   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 Argueta). He completed treatment 8/11/17. Due to residual disease, he then underwent a total laryngectomy with neck dissections and reconstruction with ALTFF (tracheostomy) on 4/4/18; Patient is with tracheoesophageal puncture and voice prosthesis.   He has been following up with Dr. Agustin regularly. CT chest on 3/20/24 showed a10 mm solid nodule, peripheral right lower lobe, abutting pleural surface.   PET/CT on 4/9/24 showed  1. FDG avid right level 1B lymph node, concerning for metastasis. Recommend ultrasound and biopsy for correlation. 2. FDG avid peripheral right lower lobe nodule, concerning for malignancy. Mildly FDG avid right hilar focus, indeterminate. 3. Markedly FDG avid lesion in the distal esophageal wall likely extending a few centimeters. This is concerning for malignancy. Recommend endoscopy. 4. FDG avid colon lesions, as above. Recommend correlation with colonoscopy. 5. Focal FDG avidity associated with a segment of small bowel in the left lower quadrant with small bowel thickening, possibly focal enteritis. Recommend CT enterography to evaluate for luminal lesions. 6. Marked enlargement of the urinary bladder.  Pt referred to Dr. Martinez and now post Right VATS, robotic assisted, RLL wedge resection with MLND 4/25/2024, path of RLL reveals Squamous cell carcinoma moderately differentiated with necrosis, measuring 2.2 cm in greatest dimension. All margins and LNs negative for tumor.   tD3ofL9 (Stage IA3).  PET/CT 9/15/24 showed: 1. Compared to PET/CT April 9, 2024, there has been increased FDG avidity of a right hilar lymph node, concerning for lymph node metastasis. 2. Status post right lower lobe wedge resection with postsurgical changes. 3. Status post laryngectomy with reconstruction and tracheostomy. No evidence of local disease recurrence. Stable mildly FDG avid right level 1B lymph node. 4. Interval development of nonspecific foci of increased FDG avidity in the prostate gland, possibly inflammatory given time course of development. Recommend correlation with PSA. 5. Resolution of distal esophageal and focal colon FDG uptake evident on prior exam.  CT chest 11/07/24 showed Multiple right hilar lymph nodes, the largest one measures 2.4 x 1.9 cm, are increased from prior exams likely metastatic. Status post right lower lobe wedge resection. No suspicious lung nodule.  Patient was also diagnosed with colon cancer and had Laparoscopic sigmoidectomy on 10/23/24 (Dr. Osuna)  for adenocarcinoma stage T1, N0 AJCC Stage: Stage I. No definitive lymphovascular invasion. No loss of nuclear expression of MMR proteins (MLH1, MSH2, MSH6, and PMS2). No residual invasive carcinoma identified. All margins negative.   On 12/12/24 patient underwent Flexible bronchoscopy EBUS with RLL BAL on 12/12/2024. Pathology revealed: 1.  Lymph node, right inferior interlobar station 11 Ri: -   Metastatic squamous cell carcinoma. -   The tumor cells are immunoreactive for p40, negative for CDX2, TTF-1.  The immunostain profile supports the above diagnosis. 2.  Right lower lobe bronchioalveolar lavage: -   Negative for malignant cells. -   Cytospin preparation and cell block bronchial cells, histiocytes and red blood cells.  01/4/25 Brain MRI negative for metastasis.  1/19/25 PET/ CT :  1.new FDG avid left upper lobe anterior segment nodule and enlarged right middle lobe fissural nodule with increased FDG activity. Higly susp for malignancy. 2. More prominent right hilar nodes with increased activity and new FDG avid right infrahilar and mediastinal nodes compatible with disease involvement. 3. Limited evaluation of bowel loops- cont. surveillance with colonoscopy. 4. Stable FDG avid right level 1B, otherwise no suspicious FDG avid malignant tissue in the head and neck.  Patient met with Dr. Zurita who recommended carbo, Abraxane, Keytruda ( Keynote 407 regimen). He is starting chemotherapy on Monday 2/3/25.  He reports 20 lb weight loss over past year. He denies pain, n/v, dysphagia.   He presents with his wife Connie to discuss the role of radiation therapy in his care.

## 2025-01-29 NOTE — PHYSICAL EXAM
[] : no respiratory distress [Heart Rate And Rhythm] : heart rate and rhythm were normal [Cervical Lymph Nodes Enlarged Posterior Bilaterally] : posterior cervical [Supraclavicular Lymph Nodes Enlarged Bilaterally] : supraclavicular [Musculoskeletal - Swelling] : no joint swelling [No Focal Deficits] : no focal deficits [Normal] : oriented to person, place and time, the affect was normal, the mood was normal and not anxious [Oriented To Time, Place, And Person] : oriented to person, place, and time [de-identified] : tracheostomy

## 2025-01-29 NOTE — PHYSICAL EXAM
[] : no respiratory distress [Heart Rate And Rhythm] : heart rate and rhythm were normal [Cervical Lymph Nodes Enlarged Posterior Bilaterally] : posterior cervical [Supraclavicular Lymph Nodes Enlarged Bilaterally] : supraclavicular [Musculoskeletal - Swelling] : no joint swelling [No Focal Deficits] : no focal deficits [Normal] : oriented to person, place and time, the affect was normal, the mood was normal and not anxious [Oriented To Time, Place, And Person] : oriented to person, place, and time [de-identified] : tracheostomy

## 2025-01-29 NOTE — HISTORY OF PRESENT ILLNESS
[FreeTextEntry1] : [id number] TU DIAZ Jul 12 1955 [phone]  69 y/o  *qhhn-[id number]-WTYHE6-VASc=3 (DM, HTN, >65) *laryngeal squamous isabella Ca s/p total laryngectomy *RLL lung nodule s/p wedge resection & LN dissection Apr '24. *HTN *DM, hpoT4, HLP, former smoker quit 20 yrs, >20 yrs 1ppd.  Reviewed results - proximal to mid LAD  stenosis also distal stenosis in other arteries FFR neg by two reports but one report states concern re: anatomic appearance of stenosis.  ECG NSR no ischemic changes.   meds: asa stopped prior to previous surgeries lipitor 20 mg qhs metoprolol succ 25 daily   A/P:  -Schedule for cardiac cath next week given  abnormal CTA w/ proximal to mid LAD stenosis.  -Return 1 week after cath to review results.  ======================= chemotherapy Dr. jose Grimes MD Medical Oncology at [facility] [email or web address] Chief of Division of Medicine - Hematology/Oncology, [facility]. Hematology and Oncology

## 2025-02-19 NOTE — ASSESSMENT
[FreeTextEntry1] :  A/P: ============================= *CAD-mid LAD 60%-FFR positive-asymptomatic-medically managing as undergoing chemotx -remains asymptomatic  -cont. ASA, statin. -discussd anginal symptoms as reason to  schedule sooner apt. -recheck cholesterol & LFTs March '25. -Will call oncologist re: chemo regimen & risk  of thrombocytopenia & anemia as it relates to intervention. ============================= *hpzw-gsuhcoehls-LKENF7-VASc=3 (DM, HTN, >65) -paroxsymal -cont. eliquis  -cont. BB. ============================= *HTN -controlled. ============================= Return 1 month FLP & OFTS prior to visit

## 2025-02-19 NOTE — HISTORY OF PRESENT ILLNESS
[FreeTextEntry1] : [id number] TU DIAZ Jul 12 1955 [phone]  70 y/o  *CAD-mid LAD 60%-FFR positive-asymptomatic-medically managing as undergoing chemotx. Ca score = 2277 *ulvj-ugfqjqkjdx-BULEK9-VASc=3 (DM, HTN, >65) *laryngeal squamous isabella Ca s/p total laryngectomy *RLL lung nodule s/p wedge resection & LN dissection Apr '24. *HTN *DM, hpoT4, HLP, former smoker quit 20 yrs, >20 yrs 1ppd.  last visit referred for cath basd on CTA  Reviewed results of cath Feb-4-'25: conclusions: "diffuse segment of mid LAD that is heavily calcified w/ mod-sev disease (FFR angio 0.77 & significant but CT-FFR neg). TODD 3 flow & asymptomatic" reccs: medical optimized from CAD standpoint w/ ASA & statin. pt to complete chemotx which was just started w/ plan to address CAD after chemoTx w/ oncology input on optimal timing or earlier if pt has symptoms. Angio: LM mild athero, LAD moderate athero 60% stenosis FFR 0.77 significant LCx mild athero RCA mid 50% LVEDP 18  Since last visit no chest pain, dsypnea baseline, no palpitations.  Reviewed plan for chemotX Two weeks one, one week off completed 1st cycle - 3 weeks total  8 cycles of chemo planned - 24 weeks today. After a few treatments will do a  PET scan & based on response will adjust treatment. PET scan planned after 2-3 treatments. 1st week in April is next PET Scan.  unsure of name of agents. ================================ ================================ HISTORY: *Apr '24 - Initial visit-preop clearance no h/o afib cleared for surgery. *May '24 - seen as hospital followup. had afib postop w/ RVR new diagnosis. Difficult to rate control converted to sinus prior to starting amio. Maintained on amio postop. Amio 200 continued. *Jun '24 - cleared for endoscopy amio continued *Jul '24 - amio d/c'd *Aug '24 - epatch for 2 weeks prescribed cleared for colonoscopy *Oct '24 - epatch 2 weeks - afib burden 2.8% one episode 6 hrs rate 100s-120s. cleared for colon resection *Dec '24 -cleared for bronch amio given preop. saw postop amio d/c'd. CT coronary angio ordered postop given Ca on CT chest. ......................... FAMILY HISTORY: sister w/ stents 60s another sister MI in 60s ......................... oncologist Hon Earl Grimes MD tele  ================================ ================================ TESTING: *ECG sinus brandy no ischemic changes. HR 45 note ave HRs 60s w/ epatch. ......................... *TTE Apr '24: EF 60-65% normal LA mild AV thickening normal PASP. ......................... *Epatch 8/27/'24-9/10/'24 Primary rhythm was sinus rhythm. Average heart rates 63, range . Afib/flutter burden 2.79%. Longest event 6hrs 50 minutes on day 14 at 01:49 AM, fastest event 143 bpm on day e at 820 AM. SVE burden 1.7% Longest pause 2.2 sec. PVC burden 1.3% Longest ventricular arrhythmia 3 beats. ......................... *CT Chest Mar '24 (NOT CORONARY CT) "extensive coronary artery calcification." "aortic calcification" ......................... CT ANGIO HEART CORONARY IC - 01/10/2025  Impression: 1.calcified plaque and noncalcified plaque in all the coronary arteries. severe stenosis in the proximal to mid LAD (70-99%), is also moderate to severe plaque in the mid to distal RCA and the distal circumflex. Will get FFR CT to rule out obstructive disease in all 3 arteries. 2.Coronary artery calcium (Agatston score 2277). 3.Calcified and noncalcified plaque in the ascending and descending aorta. 4.Enlarged mediastinal and hilar adenopathy increased in size and number as compared to 11/7/2024 with additional ill-defined opacity in the medial right lower lobe. Recommend correlation with more recent imaging and history of neoplasm. Consideration for dedicated CT of the chest  findings: LM mild stenosis LAD prox mod, mid severe, distal mild D1 Ca LCx prox moderate stenosis distal mod-severe stenosis OM1 mod stenosis RCA prox mod-severe mid mod-severe, distal mod-severe PDA PVL normal Ramus normal ------ FFR 01/11/2025  IMPRESSION:  1. The findings are of low probability for lesion specific ischemia. However, the value in the LAD is discordant with the anatomic findings and anatomically, there was severe anatomic stenosis and based on the anatomic assessment, ****the concern for this lesion being severe remains.  SHERRI BENDER MD; Attending Radiologist signed. Brandon 15 2025 9:13AM ------------------------ FFR 01/13/2025  FR-CT impression:  Left main (LM) coronary artery Normal FFR-CT analysis of the LM consistent with non-flow-limiting coronary artery disease.  Left anterior descending (LAD) coronary artery Normal FFR-CT analysis of the analyzable segments of the LAD consistent with non-flow-limiting coronary artery disease.  Left circumflex (LCX) coronary artery Normal FFR-CT analysis of the analyzable segments of the LCX consistent with non-flow-limiting coronary artery disease.  Right coronary artery (RCA) Normal FFR-CT analysis of the analyzable segments of the RCA consistent with non-flow-limiting coronary artery disease.  KAYLA SHER MD; Attending Cardiologist This document has been electronically signed. KEE SILVA MD; Attending Radiologist This document has been electronically signed. Jan 13 2025 8:53PM

## 2025-03-11 NOTE — CONSULT LETTER
[Dear  ___] : Dear  [unfilled], [Consult Letter:] : I had the pleasure of evaluating your patient, [unfilled]. [Please see my note below.] : Please see my note below. [Consult Closing:] : Thank you very much for allowing me to participate in the care of this patient.  If you have any questions, please do not hesitate to contact me. [Sincerely,] : Sincerely, [FreeTextEntry2] : Dear Dr. Bush, [FreeTextEntry3] :  Og Siu MD, PhD  Chief, Division of Laryngology  Department of Otolaryngology  [facility]  Pediatric Otolaryngology, [facility]   of Otolaryngology  [facility] [facility] [employer] [facility]

## 2025-03-11 NOTE — REASON FOR VISIT
[Subsequent Evaluation] : a subsequent evaluation for [Dysphagia] : dysphagia [FreeTextEntry2] : dysphagia

## 2025-03-11 NOTE — HISTORY OF PRESENT ILLNESS
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 Pt has been following with SLP Arthur for a TEP prosthesis change, but has some leaking from center and around the tep. +Able to voice with prosthesis.  pt is doing well, and regular diet. pt denies dysphagia, odynophagia. Had right VATS, right loser lobe wedge resection with Dr. Martinez.  Pathology report shows that surgery was able to remove the whole mass, lymph nodes were negative and margins are clear Complains of leaking through TEP tube, has not taken out tube since operation to clean it

## 2025-03-11 NOTE — PHYSICAL EXAM
[Midline] : trachea located in midline position [Normal] : no rashes [de-identified] : +clear stoma [de-identified] : able to voice with NO leaking around the tep

## 2025-03-11 NOTE — ADDENDUM
[FreeTextEntry1] : Documented by Sean Damian acting as scribe for Dr. Siu on 05/15/2024. All Medical record entries made by the Scribe were at my, Dr. Siu, direction and personally dictated by me on 05/15/2024. I have reviewed the chart and agree that the record accurately reflects my personal performance of the history, physical exam, assessment and plan. I have also personally directed, reviewed, and agreed with the discharge instructions.

## 2025-04-02 NOTE — HISTORY OF PRESENT ILLNESS
[FreeTextEntry1] : [id number] TU DIAZ Jul 12 1955 [phone]  70 y/o  *CAD-mid LAD 60%-FFR positive-asymptomatic-medically managing as undergoing chemotx. Ca score = 2277 *ghai-[id number]-SNPCT0-VASc=3 (DM, HTN, >65) *laryngeal squamous isabella Ca s/p total laryngectomy *RLL lung nodule s/p wedge resection & LN dissection Apr '24. *HTN *DM, hpoT4, HLP, former smoker quit 20 yrs, >20 yrs 1ppd.  here for followup. Reviewed labs:  Mar '25: LDL 66/direct 57 HDL 43,   LFTs apr-1-'25 in HIE normal. lipitor increased from 20 to 40 Jan '25.   ================================ ================================ HISTORY: *Apr '24 - Initial visit-preop clearance no h/o afib cleared for surgery. *May '24 - seen as hospital followup. had afib postop w/ RVR new diagnosis. Difficult to rate control converted to sinus prior to starting amio. Maintained on amio postop. Amio 200 continued. *Jun '24 - cleared for endoscopy amio continued *Jul '24 - amio d/c'd *Aug '24 - epatch for 2 weeks prescribed cleared for colonoscopy *Oct '24 - epatch 2 weeks - afib burden 2.8% one episode 6 hrs rate 100s-120s. cleared for colon resection *Dec '24 -cleared for bronch amio given preop. saw postop amio d/c'd. CT coronary angio ordered postop given Ca on CT chest. ......................... FAMILY HISTORY: sister w/ stents 60s another sister MI in 60s ......................... oncologist Hon Earl Grimes MD tele  office # [place] ================================ ================================ TESTING: *ECG sinus brandy no ischemic changes. HR 45 note ave HRs 60s w/ epatch. ......................... *TTE Apr '24: EF 60-65% normal LA mild AV thickening normal PASP. ......................... *Epatch 8/27/'24-9/10/'24 Primary rhythm was sinus rhythm. Average heart rates 63, range . Afib/flutter burden 2.79%. Longest event 6hrs 50 minutes on day 14 at 01:49 AM, fastest event 143 bpm on day e at 820 AM. SVE burden 1.7% Longest pause 2.2 sec. PVC burden 1.3% Longest ventricular arrhythmia 3 beats. ......................... *CT Chest Mar '24 (NOT CORONARY CT) "extensive coronary artery calcification." "aortic calcification" ......................... CT ANGIO HEART CORONARY IC - 01/10/2025  Impression: 1.calcified plaque and noncalcified plaque in all the coronary arteries. severe stenosis in the proximal to mid LAD (70-99%), is also moderate to severe plaque in the mid to distal RCA and the distal circumflex. Will get FFR CT to rule out obstructive disease in all 3 arteries. 2.Coronary artery calcium (Agatston score 2277). 3.Calcified and noncalcified plaque in the ascending and descending aorta. 4.Enlarged mediastinal and hilar adenopathy increased in size and number as compared to 11/7/2024 with additional ill-defined opacity in the medial right lower lobe. Recommend correlation with more recent imaging and history of neoplasm. Consideration for dedicated CT of the chest  findings: LM mild stenosis LAD prox mod, mid severe, distal mild D1 Ca LCx prox moderate stenosis distal mod-severe stenosis OM1 mod stenosis RCA prox mod-severe mid mod-severe, distal mod-severe PDA PVL normal Ramus normal ------ FFR 01/11/2025  IMPRESSION:  1. The findings are of low probability for lesion specific ischemia. However, the value in the LAD is discordant with the anatomic findings and anatomically, there was severe anatomic stenosis and based on the anatomic assessment, ****the concern for this lesion being severe remains.  SHERRI BENDER MD; Attending Radiologist signed. Brandon 15 2025 9:13AM ------------------------ FFR 01/13/2025  FR-CT impression:  Left main (LM) coronary artery Normal FFR-CT analysis of the LM consistent with non-flow-limiting coronary artery disease.  Left anterior descending (LAD) coronary artery Normal FFR-CT analysis of the analyzable segments of the LAD consistent with non-flow-limiting coronary artery disease.  Left circumflex (LCX) coronary artery Normal FFR-CT analysis of the analyzable segments of the LCX consistent with non-flow-limiting coronary artery disease.  Right coronary artery (RCA) Normal FFR-CT analysis of the analyzable segments of the RCA consistent with non-flow-limiting coronary artery disease.  KAYLA SHER MD; Attending Cardiologist This document has been electronically signed. KEE SILVA MD; Attending Radiologist This document has been electronically signed. Jan 13 2025 8:53PM ===================== ======================  ================ 	 Discharge Note Provider    	Shrink    	Status	Complete: Signed		Document Date	03- 12:09    	Facility	[facility]		Encounter Date	03- 11:16    	Clinician	Bryan Arteaga		Last Update Date	03- 12:09    	Doc Type   	[id number]		Finalized Date	03- 12:15    	 Wrap Text: Off   Hospital Course: Discharge Date 27-Mar-2025 Admission Date 24-Mar-2025 11:16 Hospital Course 68 yo M, PMHx of Colon Ca, Laryngeal Ca s/p total laryngectomy and tracheostomy, RLL nodule s/p wedge resection, CAD on ASA-81, HTN, T2DM, A-Fib on Eliquis, hypothyroid, scheduled for MediPort placement at [facility]; BIBA from home to ED c/o general weakness, near-syncope on day of admission  #pt found to have severe sepsis due to uti, given abx with which he continued to improve, currently he is feeling better and is being discharged  #He is getting his mediport and after being monitored by IR team, if cleared d/c home today.  #donald has been managed here symptomatically and now he is being discharged with further management as an outpt, time spent 45 minutes    Electronic Signatures: Bryan Arteaga (FELI) (Signed 27-Mar-2025 12:15) Authored: Hospital Course, Med Reconciliation, Care Plan/Procedures, Follow Up, Quality Measures, Document Complete   Last Updated: 27-Mar-2025 12:15 by Bryan Arteaga (FELI)  Hospital Course: Discharge Date 07-Mar-2025 Admission Date 05-Mar-2025 18:46 Reason for Admission [facility] Hospital Course HPI: Mr. Diaz is 70 y/o M with a PMHx of Colon Ca, Laryngeal Ca s/p total laryngectomy and trachyostomy, RLL nodule s/p wedge resection, CAD, HTN, T2DM, Afib on eliquis presenting for new visual changes. He describes them as blurry vision over the last 2 weeks with new floaters. He has pain as well over both eyes, pointing to his eye lids, with pressure behind his eyes. He has endorsed dizziness as of today, though he states it is because he had decreased PO intake. He denies CP, HA, SOB, nausea, vomiting, fever, visual field loss.  He is actively being treated for malignancy; he states he has had colon cancer diagnosed by polyp removal from colonoscopy in late 2024. 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 He was started on oncological regimen a month ago, and his symptoms began two weeks ago. His most recent chemotherapy was on 3/3, on which he received keytruda, paclitaxel, carboplatin, Palonosetron, dexamethasone and IVF. When his ophthalmological symptoms began, he visited his primary ophthalmologist, who referred him to a retinal specialist who noted papilledema and uveitis. For these findings, he was recommended to visit the ED for neuroimaging.  In the ED: his VS were 102/69, 72HR, 98F, and 96% on RA. Neurology and Ophthalmology saw him recommending CTA head, CT head, CTA neck, and CTV Head. MRI Head and Orbits performed as well. (06 Mar 2025 01:13)  Hospital Course: Patent with history as above presented to the ED after having complaints of blurry vision in both eyes. There was initially concern of a stroke, and neurology was consulted for imaging, scans of the head and neck were unremarkable. Opthalmology team was consulted and they recommended to get an MRI of the orbits. 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 It was decided to monitor off antibiotics, and ophtho recommended to start topical opthalmic steroid drops. Optho also recommended rheum workup and infectious workup which will be followed up on outpatient with [facility]. Oncology was consulted due to concerns that his recent Keytruda infusion on 03/03 could have caused an auto-inflammatory reaction in your eyes. Patient was discharged on opthalmic solution cyclopentolate and prednisolone eye drops, and appropriate followup was scheduled with Dr. Grimes and Vitreoretinal specialists for follow up.    Electronic Signatures: [employer] (Access [employer]) (Signed 26-Mar-2025 16:24) Authored: Med Reconciliation, Follow Up Izzy Constantino) (Signed 07-Mar-2025 14:23) Co-Signer: Hospital Course, Med Reconciliation, Care Plan/Procedures, Follow Up, Quality Measures, Document Complete Mihai Rojas) (Signed 07-Mar-2025 12:58) Authored: Hospital Course, Med Reconciliation, Care Plan/Procedures, Follow Up, Quality Measures, Document Complete Tonia Ziegler) (Signed 07-Mar-2025 13:18) Authored: Hospital Course, Med Reconciliation, Care Plan/Procedures   Last Updated: 26-Mar-2025 16:24 by [employer] (Access [employer])

## 2025-04-02 NOTE — ASSESSMENT
[FreeTextEntry1] : A/P:  -increase lipitor from 40 to 80 for goal LDL <50-55 given elevated Ca score & borderline LAD lesion. -will call

## 2025-05-15 NOTE — DATA REVIEWED
[de-identified] : PET/CT independently interpreted.  There is an avid left neck lesion which is stable on size based on CT neck dating back at least 5 years based on independent measurement of the same lesion not previous CT necks.

## 2025-05-15 NOTE — DATA REVIEWED
[de-identified] : PET/CT independently interpreted.  There is an avid left neck lesion which is stable on size based on CT neck dating back at least 5 years based on independent measurement of the same lesion not previous CT necks.

## 2025-05-19 NOTE — HISTORY OF PRESENT ILLNESS
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 Pt is continue f/u with Maria Guadalupe Amor. pt has ct of chest on 3/2024 with a10 mm solid nodule, peripheral right lower lobe, abutting pleural surface.  Dr. Martinez and post Right VATS, robotic assisted, RLL wedge resection with MLND 4/25/2024, path of RLL reveals Squamous cell carcinoma moderately differentiated with necrosis, measuring 2.2 cm in greatest dimension. All margins and LNs negative for tumor.   TEP change today 9/17/2024 with Kiristen, no leakage. PT is voicing well.  Pt is here today for abnormal PET CT and immunotherapy (carboplatin, Abraxzne, Keytruda) with Dr. Zurita for the lung cancer.  5/5/2025 PET CT 1. History increasing uptake at laryngectomy border and at new soft tissue extending across the midline into the RIGHT neck, suspicious for malignant involvement.  2. Mixed changes in the chest, with resolution of RIGHT perihilar lesion and multiple mediastinal nodes, with simultaneous resolution of a small LEFT-sided lung nodule with interval appearance of what appears to be a new nodule. Findings at the lung bases may be due to aspiration. 3. Stable FDG-avid RIGHT level 1B neck node.  PT is on regular diet, no dysphagia, no pain at the throat,  wt  loss 30lbs in 12/2024.  pt denies dysphagia, odynophagia.

## 2025-05-19 NOTE — PROCEDURE
[Unable to Cooperate with Mirror] : patient unable to cooperate with mirror [None] : none [Flexible Endoscope] : examined with the flexible endoscope [Serial Number: ___] : Serial Number: [unfilled] [de-identified] : After patient consents, a FFL is performed.  No lesions in the NPx, OPx or neopharynx.

## 2025-05-19 NOTE — PROCEDURE
[Unable to Cooperate with Mirror] : patient unable to cooperate with mirror [None] : none [Flexible Endoscope] : examined with the flexible endoscope [Serial Number: ___] : Serial Number: [unfilled] [de-identified] : After patient consents, a FFL is performed.  No lesions in the NPx, OPx or neopharynx.

## 2025-05-19 NOTE — PHYSICAL EXAM
[Normal] : no rashes [Laryngoscopy Performed] : laryngoscopy was performed, see procedure section for findings [de-identified] : Posttreatment changes, stoma patent, TEP in place, there is again some flat but regular appearing mucosal change on the right of the TE fistula.  No palpable LAD. [de-identified] : PEG site is essentiall healed. [FreeTextEntry1] : No concerning lesions in the OC/OPx on inspection or palpation.\par

## 2025-05-19 NOTE — PHYSICAL EXAM
[Normal] : no rashes [Laryngoscopy Performed] : laryngoscopy was performed, see procedure section for findings [de-identified] : Posttreatment changes, stoma patent, TEP in place, there is again some flat but regular appearing mucosal change on the right of the TE fistula.  No palpable LAD. [de-identified] : PEG site is essentiall healed. [FreeTextEntry1] : No concerning lesions in the OC/OPx on inspection or palpation.\par

## 2025-06-25 NOTE — HISTORY OF PRESENT ILLNESS
[FreeTextEntry1] : [id number] TU DIAZ Jul 12 1955 [phone]  70 y/o  *CAD-Cath Feb '25 mid LAD 60%-FFR positive-asymptomatic-medically managing as undergoing chemotx. 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 *HTN *DM, hpoT4, HLP, former smoker quit 20 yrs, >20 yrs 1ppd.  here for followup. Last visit lipitor increased from 40 to 80 for goal LDL <50-55 given elevated Ca score & borderline LAD lesion.  tolerating chemotx some diarrhea has been on chemotx, 8 sessions, Feb 10th started. chemo scheduled until July then will reassess w/ scan to see if further chemotx is needed. no plans for rad tx.  Reviewed CBCs: Hb baseline 8-10 dropped to 7.4 Jun '25 & was transfused to 10.6 - chemotx related. also transfusion May '25  platelets 244 => 86 Mar '25 => Jun plat 79  chemotx held Jun '25  chemotx: paclitaxel palonosetron  last week bottom of esophagus abnormality endoscopic biopsy  done results pending  no chest discomfort. 1 flight w/ difficulty 2/2 back issues no chest pressure. no dyspnea, no palpitations.  lost wt 30 pds metformin reduced   labs reviewed 06-: LDL 69 HDL 69    [place] lab was fasting  06- 08:05 LDL  49 chemo [place]   ECG: NSR no ischemic changes PVC isolated QTc 411 ================================ ================================ HISTORY: *Apr '24 - Initial visit-preop clearance no h/o afib cleared for surgery. *May '24 - seen as hospital followup. had afib postop w/ RVR new diagnosis. Difficult to rate control converted to sinus prior to starting amio. Maintained on amio postop. Amio 200 continued. *Jun '24 - cleared for endoscopy amio continued *Jul '24 - amio d/c'd *Aug '24 - epatch for 2 weeks prescribed cleared for colonoscopy *Oct '24 - epatch 2 weeks - afib burden 2.8% one episode 6 hrs rate 100s-120s. cleared for colon resection *Dec '24 -cleared for bronch amio given preop. saw postop amio d/c'd. CT coronary angio ordered postop given Ca on CT chest. ......................... FAMILY HISTORY: sister w/ stents 60s another sister MI in 60s ......................... oncologist Hon Earl Grimes MD tele  ================================ ================================ TESTING: *ECG sinus brandy no ischemic changes. HR 45 note ave HRs 60s w/ epatch. ......................... *TTE Apr '24: EF 60-65% normal LA mild AV thickening normal PASP. ......................... CARDIAC CATH  2/4/'25: Conclusions: diffuse segment of mid LAD heavily calcified with mod-sev stenosis FFR angio 0.77 & significant but CT-FFR neg. TODD 3 flow & asymptomatic. Reccomendations: optimize medically from CAD standpoint w/ ASA & statin at this time. pt also to complete cheomtherapy which just started with plan to address CAD after chemotherapy w/ oncology input on optimal timing or early if pt has symptoms. .........................  Mar '25: LDL 66/direct 57 HDL 43,   ......................... CT ANGIO HEART CORONARY IC - 01/10/2025  Impression: 1.calcified plaque and noncalcified plaque in all the coronary arteries. severe stenosis in the proximal to mid LAD (70-99%), is also moderate to severe plaque in the mid to distal RCA and the distal circumflex. Will get FFR CT to rule out obstructive disease in all 3 arteries. 2.Coronary artery calcium (Agatston score 2277). 3.Calcified and noncalcified plaque in the ascending and descending aorta. 4.Enlarged mediastinal and hilar adenopathy increased in size and number as compared to 11/7/2024 with additional ill-defined opacity in the medial right lower lobe. Recommend correlation with more recent imaging and history of neoplasm. Consideration for dedicated CT of the chest  findings: LM mild stenosis LAD prox mod, mid severe, distal mild D1 Ca LCx prox moderate stenosis distal mod-severe stenosis OM1 mod stenosis RCA prox mod-severe mid mod-severe, distal mod-severe PDA PVL normal Ramus normal ------ FFR 01/11/2025  IMPRESSION:  1. The findings are of low probability for lesion specific ischemia. However, the value in the LAD is discordant with the anatomic findings and anatomically, there was severe anatomic stenosis and based on the anatomic assessment, ****the concern for this lesion being severe remains.  SHERRI BENDER MD; Attending Radiologist signed. Brandon 15 2025 9:13AM ------------------------ FFR 01/13/2025  FR-CT impression:  Left circumflex (LCX) coronary artery, Right coronary artery (RCA),  Left anterior descending (LAD) coronary artery, left main (LM) coronary artery Normal FFR-CT analysis of the LM consistent with non-flow-limiting coronary artery disease. ......................... *Epatch 8/27/'24-9/10/'24 Primary rhythm was sinus rhythm. Average heart rates 63, range . Afib/flutter burden 2.79%. Longest event 6hrs 50 minutes on day 14 at 01:49 AM, fastest event 143 bpm on day e at 820 AM. SVE burden 1.7% Longest pause 2.2 sec. PVC burden 1.3% Longest ventricular arrhythmia 3 beats. ================================ ================================ HISTORY: *Apr '24 - Initial visit-preop clearance no h/o afib cleared for surgery. *May '24 - seen as hospital followup. had afib postop w/ RVR new diagnosis. Difficult to rate control converted to sinus prior to starting amio. Maintained on amio postop. Amio 200 continued. *Jun '24 - cleared for endoscopy amio continued *Jul '24 - amio d/c'd *Aug '24 - epatch for 2 weeks prescribed cleared for colonoscopy *Oct '24 - epatch 2 weeks - afib burden 2.8% one episode 6 hrs rate 100s-120s. cleared for colon resection *Dec '24 -cleared for bronch amio given preop. saw postop amio d/c'd. CT coronary angio ordered postop given Ca on CT chest. *Jan '25: coronary CT angio reviewed -obstructive CAD cardiac cath reccomended cath w/ mid LAD obstructive disease confirmed by FFR asymptomatic, concern re: chemotx & platelet count advised observation & intervention if pt has symptoms. *Apr '25: uptitrated statin to 80 mg qhs. ......................... FAMILY HISTORY: sister w/ stents 60s another sister MI in 60s ......................... oncologist Hon Earl Grimes MD ================================ ================================

## 2025-06-25 NOTE — ASSESSMENT
[FreeTextEntry1] : A/P:  -cont. current meds -sent email to Dr. Chandler interventionalist re: intermittent low platelets   & if this is a contraindication to PCI. -he remains asymptomatic so can continue to observe  -will also d/w Dr. King  - pt's medical oncologist - left message w/ office.  Also gave my business card w/ cell # to pt - he will give my cell to Dr. king  Return Sep '25.